# Patient Record
Sex: FEMALE | Race: WHITE | Employment: OTHER | URBAN - METROPOLITAN AREA
[De-identification: names, ages, dates, MRNs, and addresses within clinical notes are randomized per-mention and may not be internally consistent; named-entity substitution may affect disease eponyms.]

---

## 2018-06-17 ENCOUNTER — HOSPITAL ENCOUNTER (INPATIENT)
Age: 38
LOS: 4 days | Discharge: HOME | DRG: 638 | End: 2018-06-21
Attending: EMERGENCY MEDICINE | Admitting: FAMILY MEDICINE
Payer: MEDICARE

## 2018-06-17 ENCOUNTER — APPOINTMENT (OUTPATIENT)
Dept: GENERAL RADIOLOGY | Age: 38
DRG: 638 | End: 2018-06-17
Attending: EMERGENCY MEDICINE
Payer: MEDICARE

## 2018-06-17 DIAGNOSIS — A41.9 SEPTIC SHOCK (HCC): Primary | ICD-10-CM

## 2018-06-17 DIAGNOSIS — G47.33 OSA (OBSTRUCTIVE SLEEP APNEA): ICD-10-CM

## 2018-06-17 DIAGNOSIS — E87.29 RESPIRATORY ACIDOSIS: ICD-10-CM

## 2018-06-17 DIAGNOSIS — N17.9 ACUTE RENAL FAILURE, UNSPECIFIED ACUTE RENAL FAILURE TYPE (HCC): ICD-10-CM

## 2018-06-17 DIAGNOSIS — R65.21 SEPTIC SHOCK (HCC): Primary | ICD-10-CM

## 2018-06-17 DIAGNOSIS — E16.2 HYPOGLYCEMIA: ICD-10-CM

## 2018-06-17 DIAGNOSIS — J96.02 ACUTE RESPIRATORY FAILURE WITH HYPERCAPNIA (HCC): ICD-10-CM

## 2018-06-17 DIAGNOSIS — E66.01 MORBID OBESITY DUE TO EXCESS CALORIES (HCC): Chronic | ICD-10-CM

## 2018-06-17 DIAGNOSIS — E87.20 METABOLIC ACIDOSIS: ICD-10-CM

## 2018-06-17 DIAGNOSIS — E11.65 UNCONTROLLED TYPE 2 DIABETES MELLITUS WITH HYPERGLYCEMIA, WITHOUT LONG-TERM CURRENT USE OF INSULIN (HCC): Chronic | ICD-10-CM

## 2018-06-17 DIAGNOSIS — N17.9 AKI (ACUTE KIDNEY INJURY) (HCC): ICD-10-CM

## 2018-06-17 PROBLEM — R41.89 UNRESPONSIVE: Status: ACTIVE | Noted: 2018-06-17

## 2018-06-17 LAB
ALBUMIN SERPL-MCNC: 3.5 G/DL (ref 3.5–5)
ALBUMIN/GLOB SERPL: 0.9 {RATIO} (ref 1.2–3.5)
ALP SERPL-CCNC: 123 U/L (ref 50–136)
ALT SERPL-CCNC: 43 U/L (ref 12–65)
ANION GAP SERPL CALC-SCNC: 7 MMOL/L (ref 7–16)
ANION GAP SERPL CALC-SCNC: 8 MMOL/L (ref 7–16)
APPEARANCE UR: ABNORMAL
ARTERIAL PATENCY WRIST A: POSITIVE
ARTERIAL PATENCY WRIST A: POSITIVE
AST SERPL-CCNC: 25 U/L (ref 15–37)
BACTERIA URNS QL MICRO: 0 /HPF
BASE DEFICIT BLDA-SCNC: 3.8 MMOL/L (ref 0–2)
BASE DEFICIT BLDA-SCNC: 5.3 MMOL/L (ref 0–2)
BASOPHILS # BLD: 0 K/UL (ref 0–0.2)
BASOPHILS NFR BLD: 0 % (ref 0–2)
BDY SITE: ABNORMAL
BDY SITE: ABNORMAL
BILIRUB SERPL-MCNC: 0.5 MG/DL (ref 0.2–1.1)
BILIRUB UR QL: NEGATIVE
BUN SERPL-MCNC: 37 MG/DL (ref 6–23)
BUN SERPL-MCNC: 38 MG/DL (ref 6–23)
CALCIUM SERPL-MCNC: 8.1 MG/DL (ref 8.3–10.4)
CALCIUM SERPL-MCNC: 9.4 MG/DL (ref 8.3–10.4)
CHLORIDE SERPL-SCNC: 96 MMOL/L (ref 98–107)
CHLORIDE SERPL-SCNC: 99 MMOL/L (ref 98–107)
CK SERPL-CCNC: 278 U/L (ref 21–215)
CO2 SERPL-SCNC: 25 MMOL/L (ref 21–32)
CO2 SERPL-SCNC: 28 MMOL/L (ref 21–32)
COHGB MFR BLD: 0.9 % (ref 0.5–1.5)
COLOR UR: YELLOW
CREAT SERPL-MCNC: 3.26 MG/DL (ref 0.6–1)
CREAT SERPL-MCNC: 3.63 MG/DL (ref 0.6–1)
CRYSTALS URNS QL MICRO: ABNORMAL /LPF
DIFFERENTIAL METHOD BLD: ABNORMAL
DO-HGB BLD-MCNC: 11 % (ref 0–5)
EOSINOPHIL # BLD: 0.2 K/UL (ref 0–0.8)
EOSINOPHIL NFR BLD: 1 % (ref 0.5–7.8)
EPI CELLS #/AREA URNS HPF: ABNORMAL /HPF
ERYTHROCYTE [DISTWIDTH] IN BLOOD BY AUTOMATED COUNT: 15.8 % (ref 11.9–14.6)
GAS FLOW.O2 O2 DELIVERY SYS: 2 L/MIN
GAS FLOW.O2 O2 DELIVERY SYS: 4 L/MIN
GLOBULIN SER CALC-MCNC: 3.9 G/DL (ref 2.3–3.5)
GLUCOSE BLD STRIP.AUTO-MCNC: 114 MG/DL (ref 65–100)
GLUCOSE BLD STRIP.AUTO-MCNC: 51 MG/DL (ref 65–100)
GLUCOSE SERPL-MCNC: 54 MG/DL (ref 65–100)
GLUCOSE SERPL-MCNC: 70 MG/DL (ref 65–100)
GLUCOSE UR STRIP.AUTO-MCNC: NEGATIVE MG/DL
HCO3 BLDA-SCNC: 22 MMOL/L (ref 22–26)
HCO3 BLDA-SCNC: 22 MMOL/L (ref 22–26)
HCT VFR BLD AUTO: 37.2 % (ref 35.8–46.3)
HGB BLD-MCNC: 11.5 G/DL (ref 11.7–15.4)
HGB BLDMV-MCNC: 12.3 GM/DL (ref 11.7–15)
HGB UR QL STRIP: NEGATIVE
IMM GRANULOCYTES # BLD: 0.1 K/UL (ref 0–0.5)
IMM GRANULOCYTES NFR BLD AUTO: 1 % (ref 0–5)
KETONES UR QL STRIP.AUTO: NEGATIVE MG/DL
LACTATE BLD-SCNC: 2.6 MMOL/L (ref 0.5–1.9)
LACTATE SERPL-SCNC: 1.4 MMOL/L (ref 0.4–2)
LEUKOCYTE ESTERASE UR QL STRIP.AUTO: NEGATIVE
LIPASE SERPL-CCNC: 86 U/L (ref 73–393)
LYMPHOCYTES # BLD: 3 K/UL (ref 0.5–4.6)
LYMPHOCYTES NFR BLD: 19 % (ref 13–44)
MAGNESIUM SERPL-MCNC: 2.3 MG/DL (ref 1.8–2.4)
MCH RBC QN AUTO: 25.1 PG (ref 26.1–32.9)
MCHC RBC AUTO-ENTMCNC: 30.9 G/DL (ref 31.4–35)
MCV RBC AUTO: 81 FL (ref 79.6–97.8)
METHGB MFR BLD: 0.3 % (ref 0–1.5)
MONOCYTES # BLD: 0.8 K/UL (ref 0.1–1.3)
MONOCYTES NFR BLD: 5 % (ref 4–12)
NEUTS SEG # BLD: 11.9 K/UL (ref 1.7–8.2)
NEUTS SEG NFR BLD: 74 % (ref 43–78)
NITRITE UR QL STRIP.AUTO: NEGATIVE
OXYHGB MFR BLDA: 87.8 % (ref 94–97)
PCO2 BLDA: 45 MMHG (ref 35–45)
PCO2 BLDA: 50 MMHG (ref 35–45)
PH BLDA: 7.26 [PH] (ref 7.35–7.45)
PH BLDA: 7.32 [PH] (ref 7.35–7.45)
PH UR STRIP: 5 [PH] (ref 5–9)
PHOSPHATE SERPL-MCNC: 6.3 MG/DL (ref 2.5–4.5)
PLATELET # BLD AUTO: 288 K/UL (ref 150–450)
PMV BLD AUTO: 11 FL (ref 10.8–14.1)
PO2 BLDA: 65 MMHG (ref 80–105)
PO2 BLDA: 77 MMHG (ref 80–105)
POTASSIUM SERPL-SCNC: 3.8 MMOL/L (ref 3.5–5.1)
POTASSIUM SERPL-SCNC: 4.2 MMOL/L (ref 3.5–5.1)
PROT SERPL-MCNC: 7.4 G/DL (ref 6.3–8.2)
PROT UR STRIP-MCNC: 30 MG/DL
RBC # BLD AUTO: 4.59 M/UL (ref 4.05–5.25)
RBC #/AREA URNS HPF: ABNORMAL /HPF
SAO2 % BLD: 89 % (ref 92–98.5)
SERVICE CMNT-IMP: ABNORMAL
SERVICE CMNT-IMP: ABNORMAL
SODIUM SERPL-SCNC: 131 MMOL/L (ref 136–145)
SODIUM SERPL-SCNC: 132 MMOL/L (ref 136–145)
SP GR UR REFRACTOMETRY: 1.01 (ref 1–1.02)
TROPONIN I BLD-MCNC: 0 NG/ML (ref 0.02–0.05)
UROBILINOGEN UR QL STRIP.AUTO: 0.2 EU/DL (ref 0.2–1)
VENTILATION MODE VENT: ABNORMAL
VENTILATION MODE VENT: ABNORMAL
WBC # BLD AUTO: 15.9 K/UL (ref 4.3–11.1)
WBC URNS QL MICRO: ABNORMAL /HPF

## 2018-06-17 PROCEDURE — 36415 COLL VENOUS BLD VENIPUNCTURE: CPT | Performed by: FAMILY MEDICINE

## 2018-06-17 PROCEDURE — 96361 HYDRATE IV INFUSION ADD-ON: CPT | Performed by: EMERGENCY MEDICINE

## 2018-06-17 PROCEDURE — 65610000001 HC ROOM ICU GENERAL

## 2018-06-17 PROCEDURE — 74011000250 HC RX REV CODE- 250: Performed by: EMERGENCY MEDICINE

## 2018-06-17 PROCEDURE — 87076 CULTURE ANAEROBE IDENT EACH: CPT

## 2018-06-17 PROCEDURE — 87186 SC STD MICRODIL/AGAR DIL: CPT | Performed by: EMERGENCY MEDICINE

## 2018-06-17 PROCEDURE — 77010033711 HC HIGH FLOW OXYGEN

## 2018-06-17 PROCEDURE — 74011250636 HC RX REV CODE- 250/636

## 2018-06-17 PROCEDURE — 83690 ASSAY OF LIPASE: CPT | Performed by: EMERGENCY MEDICINE

## 2018-06-17 PROCEDURE — 84484 ASSAY OF TROPONIN QUANT: CPT

## 2018-06-17 PROCEDURE — 82550 ASSAY OF CK (CPK): CPT | Performed by: EMERGENCY MEDICINE

## 2018-06-17 PROCEDURE — 85025 COMPLETE CBC W/AUTO DIFF WBC: CPT | Performed by: EMERGENCY MEDICINE

## 2018-06-17 PROCEDURE — 93005 ELECTROCARDIOGRAM TRACING: CPT | Performed by: EMERGENCY MEDICINE

## 2018-06-17 PROCEDURE — 80048 BASIC METABOLIC PNL TOTAL CA: CPT | Performed by: FAMILY MEDICINE

## 2018-06-17 PROCEDURE — 87205 SMEAR GRAM STAIN: CPT | Performed by: EMERGENCY MEDICINE

## 2018-06-17 PROCEDURE — 84145 PROCALCITONIN (PCT): CPT | Performed by: FAMILY MEDICINE

## 2018-06-17 PROCEDURE — 83605 ASSAY OF LACTIC ACID: CPT | Performed by: FAMILY MEDICINE

## 2018-06-17 PROCEDURE — 87641 MR-STAPH DNA AMP PROBE: CPT | Performed by: EMERGENCY MEDICINE

## 2018-06-17 PROCEDURE — 74011000258 HC RX REV CODE- 258: Performed by: EMERGENCY MEDICINE

## 2018-06-17 PROCEDURE — 51702 INSERT TEMP BLADDER CATH: CPT | Performed by: EMERGENCY MEDICINE

## 2018-06-17 PROCEDURE — 74011250636 HC RX REV CODE- 250/636: Performed by: FAMILY MEDICINE

## 2018-06-17 PROCEDURE — 83605 ASSAY OF LACTIC ACID: CPT

## 2018-06-17 PROCEDURE — 96374 THER/PROPH/DIAG INJ IV PUSH: CPT | Performed by: EMERGENCY MEDICINE

## 2018-06-17 PROCEDURE — 83735 ASSAY OF MAGNESIUM: CPT | Performed by: EMERGENCY MEDICINE

## 2018-06-17 PROCEDURE — 36600 WITHDRAWAL OF ARTERIAL BLOOD: CPT

## 2018-06-17 PROCEDURE — 96375 TX/PRO/DX INJ NEW DRUG ADDON: CPT | Performed by: EMERGENCY MEDICINE

## 2018-06-17 PROCEDURE — 84100 ASSAY OF PHOSPHORUS: CPT | Performed by: EMERGENCY MEDICINE

## 2018-06-17 PROCEDURE — 74011250637 HC RX REV CODE- 250/637: Performed by: FAMILY MEDICINE

## 2018-06-17 PROCEDURE — 99285 EMERGENCY DEPT VISIT HI MDM: CPT | Performed by: EMERGENCY MEDICINE

## 2018-06-17 PROCEDURE — 82803 BLOOD GASES ANY COMBINATION: CPT

## 2018-06-17 PROCEDURE — 71045 X-RAY EXAM CHEST 1 VIEW: CPT

## 2018-06-17 PROCEDURE — 87040 BLOOD CULTURE FOR BACTERIA: CPT | Performed by: EMERGENCY MEDICINE

## 2018-06-17 PROCEDURE — 81001 URINALYSIS AUTO W/SCOPE: CPT | Performed by: EMERGENCY MEDICINE

## 2018-06-17 PROCEDURE — 77030021668 HC NEB PREFIL KT VYRM -A

## 2018-06-17 PROCEDURE — 87153 DNA/RNA SEQUENCING: CPT

## 2018-06-17 PROCEDURE — 77030005518 HC CATH URETH FOL 2W BARD -B

## 2018-06-17 PROCEDURE — 77030012794 HC KT NSL CANN/HGH TRAN -A

## 2018-06-17 PROCEDURE — 80053 COMPREHEN METABOLIC PANEL: CPT | Performed by: EMERGENCY MEDICINE

## 2018-06-17 PROCEDURE — 87077 CULTURE AEROBIC IDENTIFY: CPT | Performed by: EMERGENCY MEDICINE

## 2018-06-17 PROCEDURE — 82962 GLUCOSE BLOOD TEST: CPT

## 2018-06-17 PROCEDURE — 74011250636 HC RX REV CODE- 250/636: Performed by: EMERGENCY MEDICINE

## 2018-06-17 RX ORDER — FENOFIBRATE 67 MG/1
67 CAPSULE ORAL DAILY
COMMUNITY

## 2018-06-17 RX ORDER — SODIUM CHLORIDE 0.9 % (FLUSH) 0.9 %
5-10 SYRINGE (ML) INJECTION AS NEEDED
Status: DISCONTINUED | OUTPATIENT
Start: 2018-06-17 | End: 2018-06-21 | Stop reason: HOSPADM

## 2018-06-17 RX ORDER — METFORMIN HYDROCHLORIDE 500 MG/1
1000 TABLET, EXTENDED RELEASE ORAL 2 TIMES DAILY
COMMUNITY
End: 2019-03-21

## 2018-06-17 RX ORDER — GLIMEPIRIDE 2 MG/1
2 TABLET ORAL
Status: DISCONTINUED | OUTPATIENT
Start: 2018-06-18 | End: 2018-06-17

## 2018-06-17 RX ORDER — PREGABALIN 50 MG/1
100 CAPSULE ORAL 2 TIMES DAILY
Status: DISCONTINUED | OUTPATIENT
Start: 2018-06-18 | End: 2018-06-21 | Stop reason: HOSPADM

## 2018-06-17 RX ORDER — ATORVASTATIN CALCIUM 40 MG/1
40 TABLET, FILM COATED ORAL DAILY
COMMUNITY

## 2018-06-17 RX ORDER — SODIUM CHLORIDE, SODIUM LACTATE, POTASSIUM CHLORIDE, CALCIUM CHLORIDE 600; 310; 30; 20 MG/100ML; MG/100ML; MG/100ML; MG/100ML
1000 INJECTION, SOLUTION INTRAVENOUS
Status: COMPLETED | OUTPATIENT
Start: 2018-06-17 | End: 2018-06-17

## 2018-06-17 RX ORDER — DEXTROSE 50 % IN WATER (D50W) INTRAVENOUS SYRINGE
25-50 AS NEEDED
Status: DISCONTINUED | OUTPATIENT
Start: 2018-06-17 | End: 2018-06-21 | Stop reason: HOSPADM

## 2018-06-17 RX ORDER — ONDANSETRON 4 MG/1
4 TABLET, ORALLY DISINTEGRATING ORAL
COMMUNITY

## 2018-06-17 RX ORDER — LOSARTAN POTASSIUM AND HYDROCHLOROTHIAZIDE 12.5; 5 MG/1; MG/1
1 TABLET ORAL DAILY
COMMUNITY
End: 2019-03-21 | Stop reason: CLARIF

## 2018-06-17 RX ORDER — PREGABALIN 150 MG/1
150 CAPSULE ORAL 3 TIMES DAILY
COMMUNITY

## 2018-06-17 RX ORDER — NALOXONE HYDROCHLORIDE 1 MG/ML
INJECTION INTRAMUSCULAR; INTRAVENOUS; SUBCUTANEOUS
Status: COMPLETED
Start: 2018-06-17 | End: 2018-06-17

## 2018-06-17 RX ORDER — DEXTROSE 40 %
15 GEL (GRAM) ORAL AS NEEDED
Status: DISCONTINUED | OUTPATIENT
Start: 2018-06-17 | End: 2018-06-21 | Stop reason: HOSPADM

## 2018-06-17 RX ORDER — NALOXONE HYDROCHLORIDE 1 MG/ML
2 INJECTION INTRAMUSCULAR; INTRAVENOUS; SUBCUTANEOUS
Status: COMPLETED | OUTPATIENT
Start: 2018-06-17 | End: 2018-06-17

## 2018-06-17 RX ORDER — DULOXETIN HYDROCHLORIDE 60 MG/1
60 CAPSULE, DELAYED RELEASE ORAL DAILY
COMMUNITY
End: 2018-11-19

## 2018-06-17 RX ORDER — FLUTICASONE PROPIONATE 50 MCG
2 SPRAY, SUSPENSION (ML) NASAL DAILY
COMMUNITY
End: 2018-11-19

## 2018-06-17 RX ORDER — HEPARIN SODIUM 5000 [USP'U]/ML
5000 INJECTION, SOLUTION INTRAVENOUS; SUBCUTANEOUS EVERY 8 HOURS
Status: DISCONTINUED | OUTPATIENT
Start: 2018-06-17 | End: 2018-06-21 | Stop reason: HOSPADM

## 2018-06-17 RX ORDER — FAMOTIDINE 20 MG/1
20 TABLET, FILM COATED ORAL EVERY 12 HOURS
Status: DISCONTINUED | OUTPATIENT
Start: 2018-06-17 | End: 2018-06-21 | Stop reason: HOSPADM

## 2018-06-17 RX ORDER — SODIUM CHLORIDE 9 MG/ML
125 INJECTION, SOLUTION INTRAVENOUS CONTINUOUS
Status: DISCONTINUED | OUTPATIENT
Start: 2018-06-17 | End: 2018-06-19

## 2018-06-17 RX ORDER — INSULIN LISPRO 100 [IU]/ML
INJECTION, SOLUTION INTRAVENOUS; SUBCUTANEOUS
COMMUNITY
End: 2019-10-04

## 2018-06-17 RX ORDER — TIZANIDINE 4 MG/1
4 TABLET ORAL
COMMUNITY

## 2018-06-17 RX ORDER — ERYTHROMYCIN AND BENZOYL PEROXIDE 30; 50 MG/G; MG/G
GEL TOPICAL 2 TIMES DAILY
COMMUNITY

## 2018-06-17 RX ORDER — LEVOTHYROXINE SODIUM 100 UG/1
100 TABLET ORAL
Status: DISCONTINUED | OUTPATIENT
Start: 2018-06-18 | End: 2018-06-21 | Stop reason: HOSPADM

## 2018-06-17 RX ORDER — DEXTROSE 50 % IN WATER (D50W) INTRAVENOUS SYRINGE
50
Status: COMPLETED | OUTPATIENT
Start: 2018-06-17 | End: 2018-06-17

## 2018-06-17 RX ORDER — HYDROCODONE BITARTRATE AND ACETAMINOPHEN 5; 325 MG/1; MG/1
1 TABLET ORAL EVERY 12 HOURS
Status: DISCONTINUED | OUTPATIENT
Start: 2018-06-17 | End: 2018-06-18 | Stop reason: ALTCHOICE

## 2018-06-17 RX ORDER — ACETAMINOPHEN 325 MG/1
650 TABLET ORAL
Status: DISCONTINUED | OUTPATIENT
Start: 2018-06-17 | End: 2018-06-21 | Stop reason: HOSPADM

## 2018-06-17 RX ORDER — DULOXETIN HYDROCHLORIDE 30 MG/1
30 CAPSULE, DELAYED RELEASE ORAL DAILY
Status: DISCONTINUED | OUTPATIENT
Start: 2018-06-18 | End: 2018-06-21 | Stop reason: HOSPADM

## 2018-06-17 RX ORDER — SODIUM CHLORIDE 0.9 % (FLUSH) 0.9 %
5 SYRINGE (ML) INJECTION EVERY 8 HOURS
Status: DISCONTINUED | OUTPATIENT
Start: 2018-06-17 | End: 2018-06-21 | Stop reason: HOSPADM

## 2018-06-17 RX ORDER — MORPHINE SULFATE 15 MG/1
15 TABLET ORAL 2 TIMES DAILY
COMMUNITY
End: 2019-03-21

## 2018-06-17 RX ADMIN — VANCOMYCIN HYDROCHLORIDE 1000 MG: 1 INJECTION, POWDER, LYOPHILIZED, FOR SOLUTION INTRAVENOUS at 23:45

## 2018-06-17 RX ADMIN — FAMOTIDINE 20 MG: 20 TABLET ORAL at 23:15

## 2018-06-17 RX ADMIN — Medication 5 ML: at 23:44

## 2018-06-17 RX ADMIN — HEPARIN SODIUM 5000 UNITS: 5000 INJECTION, SOLUTION INTRAVENOUS; SUBCUTANEOUS at 23:15

## 2018-06-17 RX ADMIN — DEXTROSE MONOHYDRATE 25 G: 25 INJECTION, SOLUTION INTRAVENOUS at 20:17

## 2018-06-17 RX ADMIN — SODIUM CHLORIDE 1000 ML: 900 INJECTION, SOLUTION INTRAVENOUS at 20:30

## 2018-06-17 RX ADMIN — SODIUM CHLORIDE, SODIUM LACTATE, POTASSIUM CHLORIDE, AND CALCIUM CHLORIDE 1000 ML/HR: 600; 310; 30; 20 INJECTION, SOLUTION INTRAVENOUS at 22:06

## 2018-06-17 RX ADMIN — VANCOMYCIN HYDROCHLORIDE 1000 MG: 1 INJECTION, POWDER, LYOPHILIZED, FOR SOLUTION INTRAVENOUS at 23:00

## 2018-06-17 RX ADMIN — SODIUM CHLORIDE 1000 ML: 900 INJECTION, SOLUTION INTRAVENOUS at 20:23

## 2018-06-17 RX ADMIN — NALOXONE HYDROCHLORIDE 2 MG: 1 INJECTION INTRAMUSCULAR; INTRAVENOUS; SUBCUTANEOUS at 20:37

## 2018-06-17 RX ADMIN — NALOXONE HYDROCHLORIDE 2 MG: 1 INJECTION PARENTERAL at 20:37

## 2018-06-17 RX ADMIN — PIPERACILLIN SODIUM,TAZOBACTAM SODIUM 4.5 G: 4; .5 INJECTION, POWDER, FOR SOLUTION INTRAVENOUS at 21:42

## 2018-06-17 RX ADMIN — SODIUM CHLORIDE 1000 ML: 900 INJECTION, SOLUTION INTRAVENOUS at 22:01

## 2018-06-17 RX ADMIN — HYDROCODONE BITARTRATE AND ACETAMINOPHEN 1 TABLET: 5; 325 TABLET ORAL at 23:43

## 2018-06-17 RX ADMIN — SODIUM CHLORIDE 125 ML/HR: 900 INJECTION, SOLUTION INTRAVENOUS at 23:11

## 2018-06-17 NOTE — IP AVS SNAPSHOT
Tirso Mesfin 
 
 
 [address] 
[phone] Patient: Eliel Bains MRN: [id number] RYY:1/8/1871 About your hospitalization You were admitted on:  June 17, 2018 You last received care in the:  [address] [place] [address] You were discharged on:  June 21, 2018 Why you were hospitalized Your primary diagnosis was:  Unresponsive Your diagnoses also included:  Arf (Acute Renal Failure) (Hcc), Hypoglycemia, Sepsis (Hcc), Uncontrolled Type 2 Diabetes Mellitus, Without Long-Term Current Use Of Insulin (Hcc), Respiratory Acidosis, Morbid Obesity Due To Excess Calories (Hcc), Harlan (Obstructive Sleep Apnea) Follow-up Information Follow up With Details Comments Contact Info [facility] [place] [facility] On 6/24/2018 8:30 PM [address] [place] [phone] 
[phone] Pepe Dodson MD On 7/3/2018 APPMNT WITH DR. BLACK - 7/3/18 @ 10:10AM. [address]  [address] [facility] [place] Shayla Felix [phone] 
[phone] MD Jocelynn Tillman DO On 6/27/2018 APPMNT WITH [address] ON 6/27/18 @ 11:00AM [address] [facility] [place] [address] [address] 
[phone] Your Scheduled Appointments Tuesday July 03, 2018 10:40 AM EDT  
(Arrive by 12:10 AM) HOSPITAL with Olivia Carter NP [facility] [address]  [address] [place] [address] [facility]  
[phone] Discharge Orders None A check rosalba indicates which time of day the medication should be taken. My Medications CHANGE how you take these medications Instructions Each Dose to Equal  
 Morning Noon Evening Bedtime  
 insulin glargine 300 unit/mL (1.5 mL) Inpn What changed:  how much to take 40 Units by SubCUTAneous route nightly for 5 days. 40 Units * pregabalin 150 mg capsule Commonly known as:  Vi Hsieh What changed:  Another medication with the same name was changed. Make sure you understand how and when to take each. Your next dose is:  TODAY Take 150 mg by mouth three (3) times daily. 150 mg  
    
   
   
  
   
  
  
 * pregabalin 100 mg capsule Commonly known as:  Albert Okeefe What changed:  when to take this Take 1 Cap by mouth two (2) times a day. Max Daily Amount: 200 mg.  
 100 mg * Notice: This list has 2 medication(s) that are the same as other medications prescribed for you. Read the directions carefully, and ask your doctor or other care provider to review them with you. CONTINUE taking these medications Instructions Each Dose to Equal  
 Morning Noon Evening Bedtime  
 atorvastatin 40 mg tablet Commonly known as:  LIPITOR Your next dose is:  TODAY Take 40 mg by mouth daily. 40 mg  
    
   
   
   
  
  
 benzoyl peroxide-erythromycin 3-5 % topical gel Commonly known as:  Warnell Zendejas Your next dose is:  TODAY Apply  to affected area two (2) times a day. diclofenac 1 % Gel Commonly known as:  VOLTAREN Apply  to affected area four (4) times daily as needed for Pain. DULoxetine 60 mg capsule Commonly known as:  CYMBALTA Your next dose is:  TOMORROW Take 60 mg by mouth daily. 60 mg  
    
   
   
   
  
 exenatide microspheres 2 mg/0.85 mL Atin  
   
 2 mg by SubCUTAneous route every seven (7) days. 2 mg  
    
   
   
   
  
 fenofibrate micronized 67 mg capsule Commonly known as:  LOFIBRA Your next dose is:  TOMORROW Take 67 mg by mouth daily. 67 mg  
    
   
   
   
  
 fluticasone 50 mcg/actuation nasal spray Commonly known as:  Susu Fetch 2 Sprays by Both Nostrils route daily. 2 Spray  
    
   
   
   
  
 insulin lispro 100 unit/mL kwikpen Commonly known as:  HUMALOG Your next dose is:  TODAY 35 Units by SubCUTAneous route Before breakfast, lunch, and dinner. 35 Units  
    
   
   
  
   
  
 levothyroxine 75 mcg tablet Commonly known as:  SYNTHROID Your next dose is:  TOMORROW Take 100 mcg by mouth Daily (before breakfast). 100 mcg  
    
   
   
   
  
 lidocaine 5 % topical cream  
   
 Apply  to affected area two (2) times daily as needed for Itching. Liraglutide 0.6 mg/0.1 mL (18 mg/3 mL) Pnij Commonly known as:  Nedra Oh Your next dose is:  TOMORROW  
   
 0.6 mg by SubCUTAneous route daily. 0.6 mg  
    
   
   
   
  
 losartan-hydroCHLOROthiazide 50-12.5 mg per tablet Commonly known as:  HYZAAR Your next dose is:  TOMORROW Take 1 Tab by mouth daily. 1 Tab  
    
   
   
   
  
 metFORMIN  mg tablet Commonly known as:  GLUCOPHAGE XR Your next dose is:  TODAY Take 1,000 mg by mouth two (2) times a day. 1000 mg  
    
   
   
   
  
  
 morphine IR 15 mg tablet Commonly known as:  MS IR Take 15 mg by mouth two (2) times a day. 15 mg  
    
   
   
   
  
 tiZANidine 4 mg tablet Commonly known as:  Elmer Mins Take 4 mg by mouth three (3) times daily as needed. 4 mg ZOFRAN ODT 4 mg disintegrating tablet Generic drug:  ondansetron Take 4 mg by mouth every eight (8) hours as needed for Nausea. 4 mg Where to Get Your Medications These medications were sent to [address] [place] [address] - [place] - [address] [facility] AT [facility]  [address] [facility]  [address] [place] Phone:  [phone]  
  insulin glargine 300 unit/mL (1.5 mL) Inpn Information on where to get these meds will be given to you by the nurse or doctor. ! Ask your nurse or doctor about these medications  
  pregabalin 100 mg capsule Opioid Education Prescription Opioids: What You Need to Know: 
 
 
Dental Appliances: None Visual Aid: Glasses, With patient Home Medications: None Jewelry: None Clothing: None Other Valuables: None PATIENT INSTRUCTIONS: 
 
After general anesthesia or intravenous sedation, for 24 hours or while taking prescription Narcotics: · Limit your activities · Do not drive and operate hazardous machinery · Do not make important personal or business decisions · Do  not drink alcoholic beverages · If you have not urinated within 8 hours after discharge, please contact your surgeon on call. Report the following to your surgeon: 
· Excessive pain, swelling, redness or odor of or around the surgical area · Temperature over 100.5 · Nausea and vomiting lasting longer than 4 hours or if unable to take medications · Any signs of decreased circulation or nerve impairment to extremity: change in color, persistent  numbness, tingling, coldness or increase pain · Any questions What to do at Home: 
Recommended activity: Activity as tolerated, per MD 
 
If you experience any of the following symptoms fever>101, pain unrelieved with medication, nausea/vomiting, shortness of breath, dizziness/fainting, chest pain. , please follow up with your doctor. *  Please give a list of your current medications to your Primary Care Provider. *  Please update this list whenever your medications are discontinued, doses are 
    changed, or new medications (including over-the-counter products) are added. *  Please carry medication information at all times in case of emergency situations. These are general instructions for a healthy lifestyle: No smoking/ No tobacco products/ Avoid exposure to second hand smoke Surgeon General's Warning:  Quitting smoking now greatly reduces serious risk to your health. Obesity, smoking, and sedentary lifestyle greatly increases your risk for illness A healthy diet, regular physical exercise & weight monitoring are important for maintaining a healthy lifestyle You may be retaining fluid if you have a history of heart failure or if you experience any of the following symptoms:  Weight gain of 3 pounds or more overnight or 5 pounds in a week, increased swelling in our hands or feet or shortness of breath while lying flat in bed. Please call your doctor as soon as you notice any of these symptoms; do not wait until your next office visit. Recognize signs and symptoms of STROKE: 
 
F-face looks uneven A-arms unable to move or move unevenly S-speech slurred or non-existent T-time-call 911 as soon as signs and symptoms begin-DO NOT go Back to bed or wait to see if you get better-TIME IS BRAIN. Warning Signs of HEART ATTACK Call 911 if you have these symptoms: 
? Chest discomfort. Most heart attacks involve discomfort in the center of the chest that lasts more than a few minutes, or that goes away and comes back. It can feel like uncomfortable pressure, squeezing, fullness, or pain. ? Discomfort in other areas of the upper body. Symptoms can include pain or discomfort in one or both arms, the back, neck, jaw, or stomach. ? Shortness of breath with or without chest discomfort. ? Other signs may include breaking out in a cold sweat, nausea, or lightheadedness. Don't wait more than five minutes to call [address]! Fast action can save your life. Calling 911 is almost always the fastest way to get lifesaving treatment.  Emergency Medical Services staff can begin treatment when they arrive  up to an hour sooner than if someone gets to the hospital by car. The discharge information has been reviewed with the patient. The patient verbalized understanding. Discharge medications reviewed with the patient and appropriate educational materials and side effects teaching were provided. Learning About Bariatric Surgery What is bariatric surgery? Bariatric surgery is surgery to help you lose weight. This type of surgery is only used for people who are very overweight and have not been able to lose weight with diet and exercise. This surgery makes the stomach smaller. Some types of surgery also change the connection between your stomach and intestines. How is bariatric surgery done? Bariatric surgery may be either \"open\" or \"laparoscopic. \" Open surgery is done through a large cut (incision) in the belly. Laparoscopic surgery is done through several small cuts. The doctor puts a lighted tube, or scope, and other surgical tools through small cuts in your belly. The doctor is able to see your organs with the scope. There are different types of bariatric surgery. Gastric sleeve surgery The surgery is usually done through several small incisions in the belly. The doctor removes more than half of your stomach. This leaves a thin sleeve, or tube, that is about the size of a banana. Because part of your stomach has been removed, this can't be reversed. Shea-en-Y gastric bypass surgery Shea-en-Y (say \"perla-en-why\") surgery changes the connection between the stomach and the intestines. The doctor separates a section of your stomach from the rest of your stomach. This makes a small pouch. The new pouch will hold the food you eat. The doctor connects the stomach pouch to the middle part of the small intestine. Gastric banding surgery The surgery is usually done through several small incisions in the belly. The doctor wraps a band around the upper part of the stomach. This creates a small pouch. The small size of the pouch means that you will get full after you eat just a small amount of food. The doctor can inflate or deflate the band to adjust the size. This lets the doctor adjust how quickly food passes from the new pouch into the stomach. It does not change the connection between the stomach and the intestines. What can you expect after the surgery? You may stay in the hospital for one or more days after the surgery. How long you stay depends on the type of surgery you had. Most people need 2 to 4 weeks before they are ready to get back to their usual routine. For the first 2 to 6 weeks after surgery, you probably will need to follow a liquid or soft diet. Bit by bit, you will be able to eat more solid foods. Your doctor may advise you to work with a dietitian. This way you'll be sure to get enough protein, vitamins, and minerals while you are losing weight. Even with a healthy diet, you may need to take vitamin and mineral supplements. After surgery, you will not be able to eat very much at one time. You will get full quickly. Try not to eat too much at one time or eat foods that are high in fat or sugar. If you do, you may vomit, get stomach pain, or have diarrhea. You probably will lose weight very quickly in the first few months after surgery. As time goes on, your weight loss will slow down. You will have regular doctor visits to check how you are doing. Think of bariatric surgery as a tool to help you lose weight. It isn't an instant fix. You will still need to eat a healthy diet and get regular exercise. This will help you reach your weight goal and avoid regaining the weight you lose. Follow-up care is a key part of your treatment and safety. Be sure to make and go to all appointments, and call your doctor if you are having problems.  It's also a good idea to know your test results and keep a list of the medicines you take. Where can you learn more? Go to http://mayte-nina.info/. Enter G469 in the search box to learn more about \"Learning About Bariatric Surgery. \" Current as of: October 13, 2016 Content Version: 11.4 © 1939-9787 [email or web address]. Care instructions adapted under license by [employer] (which disclaims liability or warranty for this information). If you have questions about a medical condition or this instruction, always ask your healthcare professional. [place] [address] any warranty or liability for your use of this information. OneFold Announcement We are excited to announce that we are making your provider's discharge notes available to you in OneFold. You will see these notes when they are completed and signed by the physician that discharged you from your recent hospital stay. If you have any questions or concerns about any information you see in OneFold, please call the Health Information Department where you were seen or reach out to your Primary Care Provider for more information about your plan of care. Introducing [facility]! [employer] introduces OneFold patient portal. Now you can access parts of your medical record, email your doctor's office, and request medication refills online. 1. In your internet browser, go to [email or web address] 2. Click on the First Time User? Click Here link in the Sign In box. You will see the New Member Sign Up page. 3. Enter your OneFold Access Code exactly as it appears below. You will not need to use this code after youve completed the sign-up process. If you do not sign up before the expiration date, you must request a new code. · OneFold Access Code: [email or web address] Expires: 9/15/2018  7:51 PM 
 
4.  Enter the last four digits of your Social Security Number (xxxx) and Date of Birth (mm/dd/yyyy) as indicated and click Submit. You will be taken to the next sign-up page. 5. Create a [employer] Salest ID. This will be your [employer] login ID and cannot be changed, so think of one that is secure and easy to remember. 6. Create a [employer] Salest password. You can change your password at any time. 7. Enter your Password Reset Question and Answer. This can be used at a later time if you forget your password. 8. Enter your e-mail address. You will receive e-mail notification when new information is available in [address] 9. Click Sign Up. You can now view and download portions of your medical record. 10. Click the Download Summary menu link to download a portable copy of your medical information. If you have questions, please visit the Frequently Asked Questions section of the [employer] website. Remember, [employer] is NOT to be used for urgent needs. For medical emergencies, dial 911. Now available from your iPhone and Android! Introducing Justyn Marie As a [facility] patient, I wanted to make you aware of our electronic visit tool called Justyn Johnscharlie. [facility] 24/7 allows you to connect within minutes with a medical provider 24 hours a day, seven days a week via a mobile device or tablet or logging into a secure website from your computer. You can access Justyn Marie from anywhere in the [place]. A virtual visit might be right for you when you have a simple condition and feel like you just dont want to get out of bed, or cant get away from work for an appointment, when your regular [facility] provider is not available (evenings, weekends or holidays), or when youre out of town and need minor care. Electronic visits cost only $49 and if the [facility] 24/7 provider determines a prescription is needed to treat your condition, one can be electronically transmitted to a nearby pharmacy*. Please take a moment to enroll today if you have not already done so. The enrollment process is free and takes just a few minutes. To enroll, please download the [employer] 24/7 alphonse to your tablet or phone, or visit [email or web address] to enroll on your computer. And, as an [address] [place] [address] patient with a [employer] account, the results of your visits will be scanned into your electronic medical record and your primary care provider will be able to view the scanned results. We urge you to continue to see your regular [facility] provider for your ongoing medical care. And while your primary care provider may not be the one available when you seek a Justyn Marie virtual visit, the peace of mind you get from getting a real diagnosis real time can be priceless. For more information on Justyn Marie, view our Frequently Asked Questions (FAQs) at [email or web address]. Sincerely, 
 
Levi Rodrigez MD 
Chief Medical Officer [address] [place] [address] *:  certain medications cannot be prescribed via Justyn Marie Unresulted tests-please follow up with your PCP on these results Procedure/Test Authorizing Provider  BLOOD GAS, ARTERIAL Carley Rodarte MD  
 BLOOD GAS, ARTERIAL Carley Rodarte MD  
 BLOOD GAS, ARTERIAL Stacey Dubois MD  
 BLOOD GAS, ARTERIAL Stacey Dubois MD  
 BLOOD GAS, Renetta Ceja MD  
 BLOOD GAS, Ynes Elizalde MD  
 CBC WITH AUTOMATED MICHELLE Rodarte MD  
 CBC WITH AUTOMATED DIFF Stacey Dubois MD  
 Jean-Pierre Cross MD  
 CORTISOL, AM Isaak Tate MD  
 CULTURE, BLOOD Oswaldo Becker MD  
 CULTURE, BLOOD Oswaldo Becker MD  
 EKG, 12 LEAD, INITIAL Oswaldo Becker MD  
 HEMOGLOBIN A1C WITH EAG Sheridan Marvin MD  
 LACTIC ACID Sheridan Marvin MD  
 LACTIC ACID MD Evelyn Soares MD  
 MAGNESIUM MD Bhavani Soares MD  
 METABOLIC PANEL, BASIC Sheridan Marvin MD  
 METABOLIC PANEL, COMPREHENSIVE Sheridan Marvin MD  
 METABOLIC PANEL, COMPREHENSIVE Oswaldo Becker MD  
 PHOSPHORUS Sheridan Marvin MD  
 PHOSPHORUS Oswaldo Becker MD  
 PROCALCITONIN Sheridan Marvin MD  
 TSH 3RD GENERATION Isaak Tate MD  
 URINALYSIS W/ RFLX MICROSCOPIC MD Poncho Aiken MD  
 VANCOMYCIN, Judge Brown MD  
 XR CHEST PORT Oswaldo Becker MD  
  
Providers Seen During Your Hospitalization Provider Specialty Primary office phone Oswaldo Becker MD Emergency Medicine [phone] Sheridan Marvin MD Family Practice [phone] Your Primary Care Physician (PCP) Primary Care Physician Office Phone Office Fax [place] ** None ** ** None ** You are allergic to the following Allergen Reactions Codeine Other (comments) Lupie; not a problem Recent Documentation Height Weight BMI OB Status Smoking Status 1.651 m (!) 194.5 kg 71.34 kg/m2 Having regular periods Never Smoker Emergency Contacts Name Discharge Info Relation Home Work Mobile Aminaashley Beeerly  Parent [1] [phone] Poor,Ross DISCHARGE CAREGIVER [3] Spouse [3] [phone] Patient Belongings The following personal items are in your possession at time of discharge: Dental Appliances: None  Visual Aid: Glasses, With patient      Home Medications: None   Jewelry: None  Clothing: None    Other Valuables: None Please provide this summary of care documentation to your next provider. Signatures-by signing, you are acknowledging that this After Visit Summary has been reviewed with you and you have received a copy. Patient Signature:  ____________________________________________________________ Date:  ____________________________________________________________  
  
Devonte Mais Provider Signature:  ____________________________________________________________ Date:  ____________________________________________________________

## 2018-06-18 PROBLEM — G47.33 OSA (OBSTRUCTIVE SLEEP APNEA): Status: ACTIVE | Noted: 2018-06-18

## 2018-06-18 LAB
ALBUMIN SERPL-MCNC: 3.1 G/DL (ref 3.5–5)
ALBUMIN/GLOB SERPL: 0.8 {RATIO} (ref 1.2–3.5)
ALP SERPL-CCNC: 108 U/L (ref 50–136)
ALT SERPL-CCNC: 40 U/L (ref 12–65)
ANION GAP SERPL CALC-SCNC: 10 MMOL/L (ref 7–16)
ARTERIAL PATENCY WRIST A: POSITIVE
ARTERIAL PATENCY WRIST A: POSITIVE
AST SERPL-CCNC: 37 U/L (ref 15–37)
ATRIAL RATE: 118 BPM
BASE DEFICIT BLDA-SCNC: 2.6 MMOL/L (ref 0–2)
BASE DEFICIT BLDA-SCNC: 3.9 MMOL/L (ref 0–2)
BASOPHILS # BLD: 0 K/UL (ref 0–0.2)
BASOPHILS NFR BLD: 0 % (ref 0–2)
BDY SITE: ABNORMAL
BDY SITE: ABNORMAL
BILIRUB SERPL-MCNC: 0.4 MG/DL (ref 0.2–1.1)
BUN SERPL-MCNC: 33 MG/DL (ref 6–23)
CALCIUM SERPL-MCNC: 8.3 MG/DL (ref 8.3–10.4)
CALCULATED P AXIS, ECG09: 55 DEGREES
CALCULATED R AXIS, ECG10: 98 DEGREES
CALCULATED T AXIS, ECG11: 23 DEGREES
CHLORIDE SERPL-SCNC: 98 MMOL/L (ref 98–107)
CO2 SERPL-SCNC: 26 MMOL/L (ref 21–32)
CREAT SERPL-MCNC: 2.46 MG/DL (ref 0.6–1)
DIAGNOSIS, 93000: NORMAL
DIFFERENTIAL METHOD BLD: ABNORMAL
EOSINOPHIL # BLD: 0.1 K/UL (ref 0–0.8)
EOSINOPHIL NFR BLD: 0 % (ref 0.5–7.8)
ERYTHROCYTE [DISTWIDTH] IN BLOOD BY AUTOMATED COUNT: 16 % (ref 11.9–14.6)
EST. AVERAGE GLUCOSE BLD GHB EST-MCNC: 194 MG/DL
GAS FLOW.O2 O2 DELIVERY SYS: 4 L/MIN
GLOBULIN SER CALC-MCNC: 3.8 G/DL (ref 2.3–3.5)
GLUCOSE BLD STRIP.AUTO-MCNC: 132 MG/DL (ref 65–100)
GLUCOSE BLD STRIP.AUTO-MCNC: 199 MG/DL (ref 65–100)
GLUCOSE BLD STRIP.AUTO-MCNC: 235 MG/DL (ref 65–100)
GLUCOSE BLD STRIP.AUTO-MCNC: 249 MG/DL (ref 65–100)
GLUCOSE BLD STRIP.AUTO-MCNC: 252 MG/DL (ref 65–100)
GLUCOSE SERPL-MCNC: 177 MG/DL (ref 65–100)
HBA1C MFR BLD: 8.4 % (ref 4.8–6)
HCO3 BLDA-SCNC: 24 MMOL/L (ref 22–26)
HCO3 BLDA-SCNC: 24 MMOL/L (ref 22–26)
HCT VFR BLD AUTO: 34 % (ref 35.8–46.3)
HGB BLD-MCNC: 11.1 G/DL (ref 11.7–15.4)
IMM GRANULOCYTES # BLD: 0.1 K/UL (ref 0–0.5)
IMM GRANULOCYTES NFR BLD AUTO: 0 % (ref 0–5)
LACTATE SERPL-SCNC: 0.7 MMOL/L (ref 0.4–2)
LYMPHOCYTES # BLD: 1.8 K/UL (ref 0.5–4.6)
LYMPHOCYTES NFR BLD: 12 % (ref 13–44)
MAGNESIUM SERPL-MCNC: 2.3 MG/DL (ref 1.8–2.4)
MCH RBC QN AUTO: 26.2 PG (ref 26.1–32.9)
MCHC RBC AUTO-ENTMCNC: 32.6 G/DL (ref 31.4–35)
MCV RBC AUTO: 80.2 FL (ref 79.6–97.8)
MONOCYTES # BLD: 1.6 K/UL (ref 0.1–1.3)
MONOCYTES NFR BLD: 10 % (ref 4–12)
NEUTS SEG # BLD: 11.6 K/UL (ref 1.7–8.2)
NEUTS SEG NFR BLD: 78 % (ref 43–78)
P-R INTERVAL, ECG05: 136 MS
PCO2 BLDA: 50 MMHG (ref 35–45)
PCO2 BLDA: 53 MMHG (ref 35–45)
PH BLDA: 7.27 [PH] (ref 7.35–7.45)
PH BLDA: 7.3 [PH] (ref 7.35–7.45)
PHOSPHATE SERPL-MCNC: 5.3 MG/DL (ref 2.5–4.5)
PLATELET # BLD AUTO: 261 K/UL (ref 150–450)
PMV BLD AUTO: 11 FL (ref 10.8–14.1)
PO2 BLDA: 119 MMHG (ref 80–105)
PO2 BLDA: 82 MMHG (ref 80–105)
POTASSIUM SERPL-SCNC: 4.8 MMOL/L (ref 3.5–5.1)
PROCALCITONIN SERPL-MCNC: 0.3 NG/ML
PROT SERPL-MCNC: 6.9 G/DL (ref 6.3–8.2)
Q-T INTERVAL, ECG07: 310 MS
QRS DURATION, ECG06: 82 MS
QTC CALCULATION (BEZET), ECG08: 434 MS
RBC # BLD AUTO: 4.24 M/UL (ref 4.05–5.25)
RESP RATE: 16
SERVICE CMNT-IMP: ABNORMAL
SERVICE CMNT-IMP: ABNORMAL
SODIUM SERPL-SCNC: 134 MMOL/L (ref 136–145)
VENTILATION MODE VENT: ABNORMAL
VENTILATION MODE VENT: ABNORMAL
VENTRICULAR RATE, ECG03: 118 BPM
WBC # BLD AUTO: 15.2 K/UL (ref 4.3–11.1)

## 2018-06-18 PROCEDURE — 74011636637 HC RX REV CODE- 636/637: Performed by: FAMILY MEDICINE

## 2018-06-18 PROCEDURE — 77030018719 HC DRSG PTCH ANTIMIC J&J -A

## 2018-06-18 PROCEDURE — 84100 ASSAY OF PHOSPHORUS: CPT | Performed by: FAMILY MEDICINE

## 2018-06-18 PROCEDURE — 99223 1ST HOSP IP/OBS HIGH 75: CPT | Performed by: INTERNAL MEDICINE

## 2018-06-18 PROCEDURE — 83036 HEMOGLOBIN GLYCOSYLATED A1C: CPT | Performed by: FAMILY MEDICINE

## 2018-06-18 PROCEDURE — 83605 ASSAY OF LACTIC ACID: CPT | Performed by: FAMILY MEDICINE

## 2018-06-18 PROCEDURE — 77030020263 HC SOL INJ SOD CL0.9% LFCR 1000ML

## 2018-06-18 PROCEDURE — 80053 COMPREHEN METABOLIC PANEL: CPT | Performed by: FAMILY MEDICINE

## 2018-06-18 PROCEDURE — 65610000001 HC ROOM ICU GENERAL

## 2018-06-18 PROCEDURE — 77030018786 HC NDL GD F/USND BARD -B

## 2018-06-18 PROCEDURE — 76937 US GUIDE VASCULAR ACCESS: CPT

## 2018-06-18 PROCEDURE — 74011250637 HC RX REV CODE- 250/637: Performed by: FAMILY MEDICINE

## 2018-06-18 PROCEDURE — 36415 COLL VENOUS BLD VENIPUNCTURE: CPT | Performed by: FAMILY MEDICINE

## 2018-06-18 PROCEDURE — C1751 CATH, INF, PER/CENT/MIDLINE: HCPCS

## 2018-06-18 PROCEDURE — 74011250636 HC RX REV CODE- 250/636: Performed by: FAMILY MEDICINE

## 2018-06-18 PROCEDURE — 4A02X4A MEASUREMENT OF CARDIAC ELECTRICAL ACTIVITY, GUIDANCE, EXTERNAL APPROACH: ICD-10-PCS | Performed by: FAMILY MEDICINE

## 2018-06-18 PROCEDURE — 94660 CPAP INITIATION&MGMT: CPT

## 2018-06-18 PROCEDURE — 85025 COMPLETE CBC W/AUTO DIFF WBC: CPT | Performed by: FAMILY MEDICINE

## 2018-06-18 PROCEDURE — 74011000258 HC RX REV CODE- 258: Performed by: FAMILY MEDICINE

## 2018-06-18 PROCEDURE — 83735 ASSAY OF MAGNESIUM: CPT | Performed by: FAMILY MEDICINE

## 2018-06-18 PROCEDURE — 36569 INSJ PICC 5 YR+ W/O IMAGING: CPT | Performed by: FAMILY MEDICINE

## 2018-06-18 PROCEDURE — 82962 GLUCOSE BLOOD TEST: CPT

## 2018-06-18 PROCEDURE — 02HV33Z INSERTION OF INFUSION DEVICE INTO SUPERIOR VENA CAVA, PERCUTANEOUS APPROACH: ICD-10-PCS | Performed by: FAMILY MEDICINE

## 2018-06-18 PROCEDURE — 77010033678 HC OXYGEN DAILY

## 2018-06-18 PROCEDURE — 82803 BLOOD GASES ANY COMBINATION: CPT

## 2018-06-18 PROCEDURE — 36600 WITHDRAWAL OF ARTERIAL BLOOD: CPT

## 2018-06-18 RX ORDER — SODIUM CHLORIDE 0.9 % (FLUSH) 0.9 %
20 SYRINGE (ML) INJECTION AS NEEDED
Status: DISCONTINUED | OUTPATIENT
Start: 2018-06-18 | End: 2018-06-21 | Stop reason: HOSPADM

## 2018-06-18 RX ORDER — SENNOSIDES 25 MG/1
TABLET, FILM COATED ORAL
COMMUNITY

## 2018-06-18 RX ORDER — VANCOMYCIN 2 GRAM/500 ML IN 0.9 % SODIUM CHLORIDE INTRAVENOUS
2000 EVERY 12 HOURS
Status: DISCONTINUED | OUTPATIENT
Start: 2018-06-18 | End: 2018-06-19

## 2018-06-18 RX ORDER — HEPARIN 100 UNIT/ML
600 SYRINGE INTRAVENOUS AS NEEDED
Status: DISCONTINUED | OUTPATIENT
Start: 2018-06-18 | End: 2018-06-21 | Stop reason: HOSPADM

## 2018-06-18 RX ORDER — HEPARIN 100 UNIT/ML
600 SYRINGE INTRAVENOUS EVERY 8 HOURS
Status: DISCONTINUED | OUTPATIENT
Start: 2018-06-18 | End: 2018-06-21 | Stop reason: HOSPADM

## 2018-06-18 RX ORDER — SODIUM CHLORIDE 0.9 % (FLUSH) 0.9 %
20 SYRINGE (ML) INJECTION EVERY 8 HOURS
Status: DISCONTINUED | OUTPATIENT
Start: 2018-06-18 | End: 2018-06-21 | Stop reason: HOSPADM

## 2018-06-18 RX ADMIN — SODIUM CHLORIDE 125 ML/HR: 900 INJECTION, SOLUTION INTRAVENOUS at 13:53

## 2018-06-18 RX ADMIN — PREGABALIN 100 MG: 50 CAPSULE ORAL at 17:11

## 2018-06-18 RX ADMIN — SODIUM CHLORIDE 125 ML/HR: 900 INJECTION, SOLUTION INTRAVENOUS at 21:50

## 2018-06-18 RX ADMIN — INSULIN HUMAN 2 UNITS: 100 INJECTION, SOLUTION PARENTERAL at 17:12

## 2018-06-18 RX ADMIN — SODIUM CHLORIDE, PRESERVATIVE FREE 600 UNITS: 5 INJECTION INTRAVENOUS at 14:00

## 2018-06-18 RX ADMIN — VANCOMYCIN HYDROCHLORIDE 2000 MG: 10 INJECTION, POWDER, LYOPHILIZED, FOR SOLUTION INTRAVENOUS at 08:31

## 2018-06-18 RX ADMIN — PIPERACILLIN SODIUM AND TAZOBACTAM SODIUM 3.38 G: 3; .375 INJECTION, POWDER, LYOPHILIZED, FOR SOLUTION INTRAVENOUS at 05:29

## 2018-06-18 RX ADMIN — FAMOTIDINE 20 MG: 20 TABLET ORAL at 08:31

## 2018-06-18 RX ADMIN — Medication 5 ML: at 06:00

## 2018-06-18 RX ADMIN — PIPERACILLIN SODIUM AND TAZOBACTAM SODIUM 3.38 G: 3; .375 INJECTION, POWDER, LYOPHILIZED, FOR SOLUTION INTRAVENOUS at 13:08

## 2018-06-18 RX ADMIN — Medication 20 ML: at 15:09

## 2018-06-18 RX ADMIN — SODIUM CHLORIDE, PRESERVATIVE FREE 600 UNITS: 5 INJECTION INTRAVENOUS at 21:34

## 2018-06-18 RX ADMIN — Medication 5 ML: at 13:54

## 2018-06-18 RX ADMIN — VANCOMYCIN HYDROCHLORIDE 2000 MG: 10 INJECTION, POWDER, LYOPHILIZED, FOR SOLUTION INTRAVENOUS at 20:24

## 2018-06-18 RX ADMIN — INSULIN HUMAN 4 UNITS: 100 INJECTION, SOLUTION PARENTERAL at 08:31

## 2018-06-18 RX ADMIN — INSULIN HUMAN 4 UNITS: 100 INJECTION, SOLUTION PARENTERAL at 13:08

## 2018-06-18 RX ADMIN — FAMOTIDINE 20 MG: 20 TABLET ORAL at 20:24

## 2018-06-18 RX ADMIN — Medication 20 ML: at 21:34

## 2018-06-18 RX ADMIN — PIPERACILLIN SODIUM AND TAZOBACTAM SODIUM 3.38 G: 3; .375 INJECTION, POWDER, LYOPHILIZED, FOR SOLUTION INTRAVENOUS at 20:32

## 2018-06-18 RX ADMIN — HEPARIN SODIUM 5000 UNITS: 5000 INJECTION, SOLUTION INTRAVENOUS; SUBCUTANEOUS at 22:34

## 2018-06-18 RX ADMIN — HEPARIN SODIUM 5000 UNITS: 5000 INJECTION, SOLUTION INTRAVENOUS; SUBCUTANEOUS at 08:51

## 2018-06-18 RX ADMIN — Medication 5 ML: at 21:34

## 2018-06-18 RX ADMIN — DULOXETINE HYDROCHLORIDE 30 MG: 30 CAPSULE, DELAYED RELEASE ORAL at 08:31

## 2018-06-18 RX ADMIN — ACETAMINOPHEN 650 MG: 325 TABLET ORAL at 16:19

## 2018-06-18 RX ADMIN — INSULIN HUMAN 6 UNITS: 100 INJECTION, SOLUTION PARENTERAL at 21:39

## 2018-06-18 RX ADMIN — HEPARIN SODIUM 5000 UNITS: 5000 INJECTION, SOLUTION INTRAVENOUS; SUBCUTANEOUS at 15:09

## 2018-06-18 RX ADMIN — LEVOTHYROXINE SODIUM 100 MCG: 100 TABLET ORAL at 06:01

## 2018-06-18 NOTE — CONSULTS
CONSULT NOTE    Dayanna Bains    2018    Date of Admission:  2018    The patient's chart is reviewed and the patient is discussed with the staff. Subjective:     Patient is a 41 y.o.  female seen and evaluated at the request of Dr. Marcela Grace for the evaluation of respiratory acidosis. She was admitted last night with altered mental status and acute renal failure, she had ABG- 7.26/40/65 and she was placed on BIPAP. She was also hypoglycemic and hypotensive. She was given narcan and she woke up. She apparently was placed on new BP medication last week and her renal function was normal. She taked chronic pain meds . She says she has home sleep study maybe 2 years ago and it did craig show JEFF.              .      Review of Systems  A comprehensive review of systems was negative except for that written in the HPI. Patient Active Problem List   Diagnosis Code    Sepsis ([address] A41.9    Uncontrolled type 2 diabetes mellitus, without long-term current use of insulin ([address] E11.65    Acquired hypothyroidism E03.9    ARF (acute renal failure) ([address] N17.9    Hypoglycemia E16.2    Unresponsive R41.89    Respiratory acidosis E87.2    Morbid obesity due to excess calories (HCC) E66.01    JEFF (obstructive sleep apnea) likely G47.33       . Prior to Admission Medications   Prescriptions Last Dose Informant Patient Reported? Taking? DULoxetine (CYMBALTA) 60 mg capsule   Yes No   Sig: Take 60 mg by mouth daily. Liraglutide (VICTOZA) 0.6 mg/0.1 mL (18 mg/3 mL) pnij   Yes No   Si.6 mg by SubCUTAneous route daily. atorvastatin (LIPITOR) 40 mg tablet   Yes No   Sig: Take 40 mg by mouth daily. benzoyl peroxide-erythromycin (BENZAMYCIN) 3-5 % topical gel   Yes No   Sig: Apply  to affected area two (2) times a day.    diclofenac (VOLTAREN) 1 % gel   Yes No   Sig: Apply  to affected area four (4) times daily as needed for Pain.   exenatide microspheres 2 mg/0.85 mL atIn   Yes No   Sig: 2 mg by SubCUTAneous route every seven (7) days. fenofibrate micronized (LOFIBRA) 67 mg capsule   Yes No   Sig: Take 67 mg by mouth daily. fluticasone (FLONASE) 50 mcg/actuation nasal spray   Yes No   Si Sprays by Both Nostrils route daily. insulin glargine 300 unit/mL (1.5 mL) inpn   Yes No   Si Units by SubCUTAneous route nightly. insulin lispro (HUMALOG) 100 unit/mL kwikpen   Yes No   Si Units by SubCUTAneous route Before breakfast, lunch, and dinner. levothyroxine (SYNTHROID) 75 mcg tablet   Yes No   Sig: Take 100 mcg by mouth Daily (before breakfast). losartan-hydroCHLOROthiazide (HYZAAR) 50-12.5 mg per tablet   Yes No   Sig: Take 1 Tab by mouth daily. metFORMIN ER (GLUCOPHAGE XR) 500 mg tablet   Yes No   Sig: Take 1,000 mg by mouth two (2) times a day. morphine IR (MS IR) 15 mg tablet   Yes No   Sig: Take 15 mg by mouth two (2) times a day. ondansetron (ZOFRAN ODT) 4 mg disintegrating tablet   Yes No   Sig: Take 4 mg by mouth every eight (8) hours as needed for Nausea. pregabalin (LYRICA) 150 mg capsule   Yes No   Sig: Take 150 mg by mouth three (3) times daily. tiZANidine (ZANAFLEX) 4 mg tablet   Yes No   Sig: Take 4 mg by mouth three (3) times daily as needed.       Facility-Administered Medications: None       Past Medical History:   Diagnosis Date    Arthritis     Chronic pain     back    Diabetes ([address]     Type 2, av fastin glucose 214, can tell when glucose lower than50    Endocrine disease     Gastrointestinal disorder     reflux    GERD (gastroesophageal reflux disease)     Infectious disease     MRSA    JEFF (obstructive sleep apnea) likely 2018    Other ill-defined conditions(799.89)     ruptured disc, thyroid, platelet disorder    Psychiatric disorder     Thyroid disease     hypothyroidism     Past Surgical History:   Procedure Laterality Date    HX GYN      ovarian cyst drained    HX ORTHOPAEDIC  ,     back x 2     Social History Social History    Marital status:      Spouse name: N/A    Number of children: N/A    Years of education: N/A     Occupational History    Not on file.      Social History Main Topics    Smoking status: Never Smoker    Smokeless tobacco: Never Used    Alcohol use No    Drug use: No    Sexual activity: No     Other Topics Concern    Not on file     Social History Narrative     Family History   Problem Relation Age of Onset    Diabetes Mother     Cancer Father     Hypertension Father     Diabetes Father      Allergies   Allergen Reactions    Codeine Other (comments)     Lupie; not a problem       Current Facility-Administered Medications   Medication Dose Route Frequency    vancomycin (VANCOCIN) 2000 mg in  ml infusion  2,000 mg IntraVENous Q12H    [START ON 6/19/2018] VANCOMYCIN INFORMATION NOTE   Other ONCE    DULoxetine (CYMBALTA) capsule 30 mg  30 mg Oral DAILY    HYDROcodone-acetaminophen (NORCO) 5-325 mg per tablet 1 Tab  1 Tab Oral Q12H    levothyroxine (SYNTHROID) tablet 100 mcg  100 mcg Oral ACB    pregabalin (LYRICA) capsule 100 mg  100 mg Oral BID    sodium chloride (NS) flush 5 mL  5 mL InterCATHeter Q8H    sodium chloride (NS) flush 5-10 mL  5-10 mL InterCATHeter PRN    insulin regular (NOVOLIN R, HUMULIN R) injection   SubCUTAneous AC&HS    dextrose 40% (GLUTOSE) oral gel 1 Tube  15 g Oral PRN    glucagon (GLUCAGEN) injection 1 mg  1 mg IntraMUSCular PRN    dextrose (D50W) injection syrg 12.5-25 g  25-50 mL IntraVENous PRN    0.9% sodium chloride infusion  125 mL/hr IntraVENous CONTINUOUS    acetaminophen (TYLENOL) tablet 650 mg  650 mg Oral Q4H PRN    famotidine (PEPCID) tablet 20 mg  20 mg Oral Q12H    heparin (porcine) injection 5,000 Units  5,000 Units SubCUTAneous Q8H    piperacillin-tazobactam (ZOSYN) 3.375 g in 0.9% sodium chloride (MBP/ADV) 100 mL  3.375 g IntraVENous Q8H         Objective:     Vitals:    06/18/18 0519 06/18/18 0534 06/18/18 0040 06/18/18 0745   BP: 100/68 119/77     Pulse: (!) 119 (!) 120     Resp: 16 25     Temp:       SpO2: 98% 98% 99% 100%   Weight:       Height:           PHYSICAL EXAM     Constitutional:  the patient is well developed and in no acute distress  EENMT:  Sclera clear, pupils equal, oral mucosa moist  Respiratory: distant  Cardiovascular:  RRR without M,G,R  Gastrointestinal: soft and non-tender; with positive bowel sounds. Musculoskeletal: warm without cyanosis. There is plus 1 lower leg edema.   Skin:  no jaundice or rashes, no wounds   Neurologic: no gross neuro deficits     Psychiatric:  Somnolent     CXR:        Recent Labs      06/18/18 0310 06/17/18 2020   WBC  15.2*  15.9*   HGB  11.1*  11.5*   HCT  34.0*  37.2   PLT  261  288     Recent Labs      06/18/18   0310  06/17/18   2302  06/17/18 2020   NA  134*  132*  131*   K  4.8  4.2  3.8   CL  98  99  96*   GLU  177*  70  54*   CO2  26  25  28   BUN  33*  38*  37*   CREA  2.46*  3.26*  3.63*   MG  2.3   --   2.3   PHOS  5.3*   --   6.3*   CA  8.3  8.1*  9.4   ALB  3.1*   --   3.5   TBILI  0.4   --   0.5   ALT  40   --   43   SGOT  37   --   25     Recent Labs      06/18/18   0735  06/18/18   0412  06/17/18   2328   PH  7.30*  7.27*  7.32*   PCO2  50*  53*  45   PO2  119*  82  77*   HCO3  24  24  22     Recent Labs      06/18/18   0310  06/17/18   2302   LAC  0.7  1.4       Assessment:  (Medical Decision Making)     Hospital Problems  Never Reviewed          Codes Class Noted POA    JEFF (obstructive sleep apnea) likely ICD-10-CM: G47.33  ICD-9-CM: 327.23  6/18/2018 Unknown        ARF (acute renal failure) ([email or web address] ICD-10-CM: N17.9  ICD-9-CM: 584.9  6/17/2018 Yes        Hypoglycemia ICD-10-CM: E16.2  ICD-9-CM: 251.2  6/17/2018 Yes        * (Principal)Unresponsive ICD-10-CM: R41.89  ICD-9-CM: 780.09  6/17/2018 Yes        Respiratory acidosis ICD-10-CM: T87.4  ICD-9-CM: 276.2  6/17/2018 Yes        Morbid obesity due to excess calories (HCC) (Chronic) ICD-10-CM: E66.01  ICD-9-CM: 278.01  6/17/2018 Yes        Sepsis ([facility] ICD-10-CM: A41.9  ICD-9-CM: 038.9, 995.91  4/1/2016 Yes        Uncontrolled type 2 diabetes mellitus, without long-term current use of insulin (HCC) (Chronic) ICD-10-CM: E11.65  ICD-9-CM: 250.02  4/1/2016 Yes              Plan:  (Medical Decision Making)     -- she will need PSG as outpatient  - agree with iv fluids, probably her pain med and renal failure caused her AMS ,. She likely has underlying JEFF and she agrees to be tested. - can have BIPAP at night for now    More than 50% of the time documented was spent in face-to-face contact with the patient and in the care of the patient on the floor/unit where the patient is located. Thank you very much for this referral.  We appreciate the opportunity to participate in this patient's care. Will follow along with above stated plan.     Jaydon Mak MD

## 2018-06-18 NOTE — ED NOTES
TRANSFER - OUT REPORT:    Verbal report given to Debi on Dayanna M Poor  being transferred to [facility] for routine progression of care       Report consisted of patients Situation, Background, Assessment and   Recommendations(SBAR). Information from the following report(s) SBAR, ED Summary and MAR was reviewed with the receiving nurse. Lines:   Peripheral IV 06/17/18 (Active)   Site Assessment Clean, dry, & intact 6/17/2018  8:18 PM   Dressing Status Clean, dry, & intact 6/17/2018  8:18 PM       Intraosseous Line 06/17/18 Right;[place] (Active)   Site Assessment Clean, dry, & intact 6/17/2018  8:19 PM   Dressing Status Clean, dry, & intact 6/17/2018  8:19 PM   Status Infusing 6/17/2018  8:19 PM        Opportunity for questions and clarification was provided.       Patient transported with:   Monitor  O2 @ 15 liters  Registered Nurse  [facility]

## 2018-06-18 NOTE — PROGRESS NOTES
Care Management Interventions  Mode of Transport at Discharge: Other (see comment)  Transition of Care Consult (CM Consult): Other  Current Support Network: Lives with Spouse  Confirm Follow Up Transport: Family  Plan discussed with Pt/Family/Caregiver: Yes  Freedom of Choice Offered: Yes  Discharge Location  Discharge Placement: Home  Saw pt in interdisciplinarily rounds, plan of care and discharge date/ location discussed. Per the hospitalitis plan to continue to monitor pt progress, PT will be ordered when pt is more stable. Pt lives at home with  and is indep with ADL's.

## 2018-06-18 NOTE — PROGRESS NOTES
Initial visit by  to convey care and concern and encourage patient that  services are available if desired. Visited with Ms. Bains and her spouse, empathically listened to recent events and offered spiritual interventions, including prayer. Ms. Bains stated that her vision had improved since arriving to hospital. Patient's spouse Jennifer Richards identified himself as a  during the visit and he was very engaged in our conversation about Ms. Bains. He expressed appreciation or the visit. Provided business card for future reference.      Hayden Allen 69  Board Certified

## 2018-06-18 NOTE — PROGRESS NOTES
Hospitalist Progress Note     Admit Date:  2018  7:52 PM   Name:  Saira Bains   Age:  41 y.o.  :  1980   MRN:  [id number]   PCP:  Kalee Garay MD  Treatment Team: Attending Provider: Osei Grubbs MD; Consulting Provider: Dutch Muñoz MD; Consulting Provider: Nehemias Aponte MD  Patient is a 41 y.o. female who presents to the ER due to being essentially unresponsive at home. In ER, she was noted to be minimally responsive, hypoglycemic, hypotensive, hypoxic. She was hard to get an IV and a IO had to be placed. Family reports recent abdominal pain a few days ago; unknown if n/v.  Spouse noted that she has been \"nodding off\"/\"passing out\" a lot lately. ABG showed her to have respiratory acidosis with hypoxia and hypercapnia. Improved mentation after narcan. Subjective:   18  Pt on bipap-  at bedside-c/o chronic shoulder pain- able to converse appropriately.       Objective:   Patient Vitals for the past 24 hrs:   Temp Pulse Resp BP SpO2   18 1701 - (!) 116 23 135/59 91 %   18 1619 (!) 101.9 °F (38.8 °C) (!) 110 20 127/52 96 %   18 1517 - (!) 123 14 105/41 98 %   18 1511 99.8 °F (37.7 °C) - - - -   18 1436 - (!) 124 - 120/65 95 %   18 1406 - (!) 124 11 119/83 95 %   18 1336 - (!) 122 - 133/58 96 %   18 1335 - - 22 - -   18 1235 99.6 °F (37.6 °C) (!) 117 20 125/57 97 %   18 1131 - (!) 114 18 99/74 100 %   18 1100 - (!) 110 23 (!) 86/55 100 %   18 1030 - (!) 109 23 93/67 100 %   18 1019 - - - - 98 %   18 1000 - (!) 114 23 90/45 99 %   18 0930 - (!) 115 28 111/51 97 %   18 0900 - (!) 110 21 93/65 94 %   18 0830 - (!) 114 (!) 32 97/51 98 %   18 0800 - (!) 110 10 91/49 99 %   18 0745 - - - - 100 %   18 0727 - - - - 99 %   18 0649 98.9 °F (37.2 °C) (!) 114 11 (!) 82/52 99 %   18 0534 - (!) 120 25 119/77 98 %   18 0519 - (!) 119 16 100/68 98 %   06/18/18 0504 - (!) 119 12 113/63 98 %   06/18/18 0449 - (!) 118 24 116/86 97 %   06/18/18 0446 - - - - 98 %   06/18/18 0434 - (!) 122 23 99/58 97 %   06/18/18 0404 98.8 °F (37.1 °C) (!) 120 13 107/61 95 %   06/18/18 0349 - (!) 120 19 101/68 96 %   06/18/18 0319 - (!) 119 15 130/70 95 %   06/18/18 0304 - (!) 123 13 131/68 94 %   06/18/18 0249 - (!) 122 18 118/71 95 %   06/18/18 0234 - (!) 123 14 131/68 95 %   06/18/18 0219 - (!) 120 19 122/74 95 %   06/18/18 0204 - (!) 123 14 133/62 94 %   06/18/18 0149 - (!) 122 15 106/69 95 %   06/18/18 0134 - (!) 119 15 99/74 94 %   06/18/18 0119 - (!) 122 22 (!) 79/63 95 %   06/18/18 0104 - (!) 116 22 (!) 77/61 93 %   06/18/18 0049 - (!) 120 (!) 37 (!) 78/65 93 %   06/18/18 0034 - (!) 121 19 (!) 84/60 93 %   06/18/18 0019 - (!) 118 19 - 92 %   06/18/18 0016 - (!) 116 (!) 36 93/47 93 %   06/17/18 2351 98.5 °F (36.9 °C) (!) 114 22 (!) 87/61 95 %   06/17/18 2335 - (!) 118 30 (!) 87/66 98 %   06/17/18 2315 - (!) 119 21 (!) 83/50 94 %   06/17/18 2254 98.9 °F (37.2 °C) - - - -   06/17/18 2236 98.9 °F (37.2 °C) (!) 116 16 96/50 94 %   06/17/18 2203 98.9 °F (37.2 °C) (!) 118 22 118/53 99 %   06/17/18 2056 - - - - 99 %   06/17/18 2031 - (!) 111 - 103/51 98 %   06/17/18 2024 - (!) 111 - (!) 81/46 97 %   06/17/18 2022 - (!) 116 - (!) 68/47 91 %   06/17/18 2020 - (!) 115 - (!) 78/35 94 %   06/17/18 2011 - (!) 119 - - (!) 79 %   06/17/18 2008 - - 10 101/48 -   06/17/18 1956 97 °F (36.1 °C) - 12 - -     Oxygen Therapy  O2 Sat (%): 91 % (06/18/18 1701)  Pulse via Oximetry: 117 beats per minute (06/18/18 1701)  O2 Device: Nasal cannula (06/18/18 1315)  O2 Flow Rate (L/min): 4 l/min (06/18/18 1315)  O2 Temperature: 89.6 °F (32 °C) (06/17/18 2056)  FIO2 (%): 35 % (06/18/18 1235)    Intake/Output Summary (Last 24 hours) at 06/18/18 1737  Last data filed at 06/18/18 1713   Gross per 24 hour   Intake          4639.67 ml   Output             5185 ml   Net          -545.33 ml         General: Well nourished. Alert. On bipap- not in resp distress  heent- normal  CV:   RRR. No murmur, rub, or gallop. Lungs:   Clear to auscultation bilaterally. No wheezing, rhonchi, or rales. Abdomen:   Soft, nontender, nondistended. Morbid obesity  Cns- moves all ext, no focal neurological deficits  Extremities: Warm and dry. No cyanosis or edema. Intraosseous access rt leg  Skin:     No rashes or jaundice. Data Review:  I have reviewed all labs, meds, telemetry events, and studies from the last 24 hours. Recent Results (from the past 24 hour(s))   GLUCOSE, POC    Collection Time: 06/17/18  7:55 PM   Result Value Ref Range    Glucose (POC) 51 (L) 65 - 100 mg/dL   EKG, 12 LEAD, INITIAL    Collection Time: 06/17/18  8:15 PM   Result Value Ref Range    Ventricular Rate 118 BPM    Atrial Rate 118 BPM    P-R Interval 136 ms    QRS Duration 82 ms    Q-T Interval 310 ms    QTC Calculation (Bezet) 434 ms    Calculated P Axis 55 degrees    Calculated R Axis 98 degrees    Calculated T Axis 23 degrees    Diagnosis       !! AGE AND GENDER SPECIFIC ECG ANALYSIS !! Sinus tachycardia  Rightward axis  Borderline ECG  When compared with ECG of 01-APR-2016 01:48,  No significant change was found  Confirmed by ST DANISH FRANCIS MD (), REDD SALINAS ([id number]) on 6/18/2018 9:21:12 AM     BLOOD GAS, ARTERIAL    Collection Time: 06/17/18  8:18 PM   Result Value Ref Range    pH 7.26 (L) 7.35 - 7.45      PCO2 50 (H) 35 - 45 mmHg    PO2 65 (L) 80 - 105 mmHg    BICARBONATE 22 22 - 26 mmol/L    BASE DEFICIT 5.3 (H) 0 - 2 mmol/L    TOTAL HEMOGLOBIN 12.3 11.7 - 15.0 GM/DL    O2 SAT 89 (L) 92 - 98.5 %    Arterial O2 Hgb 87.8 (L) 94 - 97 %    CARBOXYHEMOGLOBIN 0.9 0.5 - 1.5 %    METHEMOGLOBIN 0.3 0.0 - 1.5 %    DEOXYHEMOGLOBIN 11 (H) 0.0 - 5.0 %    SITE RR     ALLENS TEST POSITIVE      MODE NC     O2 FLOW 2.00 L/min    Respiratory comment: Dr. Nelsy Tobin at 6 17 2018 8 23 33 PM. Read back.     GLUCOSE, POC    Collection Time: 06/17/18  8:18 PM   Result Value Ref Range    Glucose (POC) 114 (H) 65 - 100 mg/dL   CBC WITH AUTOMATED DIFF    Collection Time: 06/17/18  8:20 PM   Result Value Ref Range    WBC 15.9 (H) 4.3 - 11.1 K/uL    RBC 4.59 4.05 - 5.25 M/uL    HGB 11.5 (L) 11.7 - 15.4 g/dL    HCT 37.2 35.8 - 46.3 %    MCV 81.0 79.6 - 97.8 FL    MCH 25.1 (L) 26.1 - 32.9 PG    MCHC 30.9 (L) 31.4 - 35.0 g/dL    RDW 15.8 (H) 11.9 - 14.6 %    PLATELET 641 768 - 513 K/uL    MPV 11.0 10.8 - 14.1 FL    DF AUTOMATED      NEUTROPHILS 74 43 - 78 %    LYMPHOCYTES 19 13 - 44 %    MONOCYTES 5 4.0 - 12.0 %    EOSINOPHILS 1 0.5 - 7.8 %    BASOPHILS 0 0.0 - 2.0 %    IMMATURE GRANULOCYTES 1 0.0 - 5.0 %    ABS. NEUTROPHILS 11.9 (H) 1.7 - 8.2 K/UL    ABS. LYMPHOCYTES 3.0 0.5 - 4.6 K/UL    ABS. MONOCYTES 0.8 0.1 - 1.3 K/UL    ABS. EOSINOPHILS 0.2 0.0 - 0.8 K/UL    ABS. BASOPHILS 0.0 0.0 - 0.2 K/UL    ABS. IMM. GRANS. 0.1 0.0 - 0.5 K/UL   METABOLIC PANEL, COMPREHENSIVE    Collection Time: 06/17/18  8:20 PM   Result Value Ref Range    Sodium 131 (L) 136 - 145 mmol/L    Potassium 3.8 3.5 - 5.1 mmol/L    Chloride 96 (L) 98 - 107 mmol/L    CO2 28 21 - 32 mmol/L    Anion gap 7 7 - 16 mmol/L    Glucose 54 (L) 65 - 100 mg/dL    BUN 37 (H) 6 - 23 MG/DL    Creatinine 3.63 (H) 0.6 - 1.0 MG/DL    GFR est AA 18 (L) >60 ml/min/1.73m2    GFR est non-AA 15 (L) >60 ml/min/1.73m2    Calcium 9.4 8.3 - 10.4 MG/DL    Bilirubin, total 0.5 0.2 - 1.1 MG/DL    ALT (SGPT) 43 12 - 65 U/L    AST (SGOT) 25 15 - 37 U/L    Alk.  phosphatase 123 50 - 136 U/L    Protein, total 7.4 6.3 - 8.2 g/dL    Albumin 3.5 3.5 - 5.0 g/dL    Globulin 3.9 (H) 2.3 - 3.5 g/dL    A-G Ratio 0.9 (L) 1.2 - 3.5     MAGNESIUM    Collection Time: 06/17/18  8:20 PM   Result Value Ref Range    Magnesium 2.3 1.8 - 2.4 mg/dL   PHOSPHORUS    Collection Time: 06/17/18  8:20 PM   Result Value Ref Range    Phosphorus 6.3 (H) 2.5 - 4.5 MG/DL   CK    Collection Time: 06/17/18  8:20 PM   Result Value Ref Range     (H) 21 - 215 U/L   LIPASE Collection Time: 06/17/18  8:20 PM   Result Value Ref Range    Lipase 86 73 - 393 U/L   POC TROPONIN-I    Collection Time: 06/17/18  8:22 PM   Result Value Ref Range    Troponin-I (POC) 0 (L) 0.02 - 0.05 ng/ml   POC LACTIC ACID    Collection Time: 06/17/18  8:26 PM   Result Value Ref Range    Lactic Acid (POC) 2.6 (H) 0.5 - 1.9 mmol/L   CULTURE, BLOOD    Collection Time: 06/17/18  9:12 PM   Result Value Ref Range    Special Requests: RIGHT  CENTRAL        Culture result: NO GROWTH AFTER 15 HOURS     URINALYSIS W/ RFLX MICROSCOPIC    Collection Time: 06/17/18  9:13 PM   Result Value Ref Range    Color YELLOW      Appearance TURBID      Specific gravity 1.007 1.001 - 1.023      pH (UA) 5.0 5.0 - 9.0      Protein 30 (A) NEG mg/dL    Glucose NEGATIVE  mg/dL    Ketone NEGATIVE  NEG mg/dL    Bilirubin NEGATIVE  NEG      Blood NEGATIVE  NEG      Urobilinogen 0.2 0.2 - 1.0 EU/dL    Nitrites NEGATIVE  NEG      Leukocyte Esterase NEGATIVE  NEG      WBC 3-5 0 /hpf    RBC 3-5 0 /hpf    Epithelial cells 3-5 0 /hpf    Bacteria 0 0 /hpf    Crystals, urine OCCASIONAL 0 /LPF   CULTURE, BLOOD    Collection Time: 06/17/18  9:14 PM   Result Value Ref Range    Special Requests: LEFT  Antecubital        Culture result: NO GROWTH AFTER 15 HOURS     METABOLIC PANEL, BASIC    Collection Time: 06/17/18 11:02 PM   Result Value Ref Range    Sodium 132 (L) 136 - 145 mmol/L    Potassium 4.2 3.5 - 5.1 mmol/L    Chloride 99 98 - 107 mmol/L    CO2 25 21 - 32 mmol/L    Anion gap 8 7 - 16 mmol/L    Glucose 70 65 - 100 mg/dL    BUN 38 (H) 6 - 23 MG/DL    Creatinine 3.26 (H) 0.6 - 1.0 MG/DL    GFR est AA 21 (L) >60 ml/min/1.73m2    GFR est non-AA 17 (L) >60 ml/min/1.73m2    Calcium 8.1 (L) 8.3 - 10.4 MG/DL   LACTIC ACID    Collection Time: 06/17/18 11:02 PM   Result Value Ref Range    Lactic acid 1.4 0.4 - 2.0 MMOL/L   PROCALCITONIN    Collection Time: 06/17/18 11:02 PM   Result Value Ref Range    Procalcitonin 0.3 ng/mL   BLOOD GAS, ARTERIAL Collection Time: 06/17/18 11:28 PM   Result Value Ref Range    pH 7.32 (L) 7.35 - 7.45      PCO2 45 35 - 45 mmHg    PO2 77 (L) 80 - 105 mmHg    BICARBONATE 22 22 - 26 mmol/L    BASE DEFICIT 3.8 (H) 0 - 2 mmol/L    SITE RR     ALLENS TEST POSITIVE      MODE NC     O2 FLOW 4.00 L/min    Respiratory comment: Sherry DIAMOND at 6 17 2018 11 34 30 PM. Read back. GLUCOSE, POC    Collection Time: 06/18/18  1:54 AM   Result Value Ref Range    Glucose (POC) 132 (H) 65 - 100 mg/dL   HEMOGLOBIN A1C WITH EAG    Collection Time: 06/18/18  3:10 AM   Result Value Ref Range    Hemoglobin A1c 8.4 (H) 4.8 - 6.0 %    Est. average glucose 135 mg/dL   METABOLIC PANEL, COMPREHENSIVE    Collection Time: 06/18/18  3:10 AM   Result Value Ref Range    Sodium 134 (L) 136 - 145 mmol/L    Potassium 4.8 3.5 - 5.1 mmol/L    Chloride 98 98 - 107 mmol/L    CO2 26 21 - 32 mmol/L    Anion gap 10 7 - 16 mmol/L    Glucose 177 (H) 65 - 100 mg/dL    BUN 33 (H) 6 - 23 MG/DL    Creatinine 2.46 (H) 0.6 - 1.0 MG/DL    GFR est AA 28 (L) >60 ml/min/1.73m2    GFR est non-AA 23 (L) >60 ml/min/1.73m2    Calcium 8.3 8.3 - 10.4 MG/DL    Bilirubin, total 0.4 0.2 - 1.1 MG/DL    ALT (SGPT) 40 12 - 65 U/L    AST (SGOT) 37 15 - 37 U/L    Alk.  phosphatase 108 50 - 136 U/L    Protein, total 6.9 6.3 - 8.2 g/dL    Albumin 3.1 (L) 3.5 - 5.0 g/dL    Globulin 3.8 (H) 2.3 - 3.5 g/dL    A-G Ratio 0.8 (L) 1.2 - 3.5     MAGNESIUM    Collection Time: 06/18/18  3:10 AM   Result Value Ref Range    Magnesium 2.3 1.8 - 2.4 mg/dL   PHOSPHORUS    Collection Time: 06/18/18  3:10 AM   Result Value Ref Range    Phosphorus 5.3 (H) 2.5 - 4.5 MG/DL   CBC WITH AUTOMATED DIFF    Collection Time: 06/18/18  3:10 AM   Result Value Ref Range    WBC 15.2 (H) 4.3 - 11.1 K/uL    RBC 4.24 4.05 - 5.25 M/uL    HGB 11.1 (L) 11.7 - 15.4 g/dL    HCT 34.0 (L) 35.8 - 46.3 %    MCV 80.2 79.6 - 97.8 FL    MCH 26.2 26.1 - 32.9 PG    MCHC 32.6 31.4 - 35.0 g/dL    RDW 16.0 (H) 11.9 - 14.6 %    PLATELET 886 215 - 706 K/uL    MPV 11.0 10.8 - 14.1 FL    DF AUTOMATED      NEUTROPHILS 78 43 - 78 %    LYMPHOCYTES 12 (L) 13 - 44 %    MONOCYTES 10 4.0 - 12.0 %    EOSINOPHILS 0 (L) 0.5 - 7.8 %    BASOPHILS 0 0.0 - 2.0 %    IMMATURE GRANULOCYTES 0 0.0 - 5.0 %    ABS. NEUTROPHILS 11.6 (H) 1.7 - 8.2 K/UL    ABS. LYMPHOCYTES 1.8 0.5 - 4.6 K/UL    ABS. MONOCYTES 1.6 (H) 0.1 - 1.3 K/UL    ABS. EOSINOPHILS 0.1 0.0 - 0.8 K/UL    ABS. BASOPHILS 0.0 0.0 - 0.2 K/UL    ABS. IMM. GRANS. 0.1 0.0 - 0.5 K/UL   LACTIC ACID    Collection Time: 06/18/18  3:10 AM   Result Value Ref Range    Lactic acid 0.7 0.4 - 2.0 MMOL/L   BLOOD GAS, ARTERIAL    Collection Time: 06/18/18  4:12 AM   Result Value Ref Range    pH 7.27 (L) 7.35 - 7.45      PCO2 53 (H) 35 - 45 mmHg    PO2 82 80 - 105 mmHg    BICARBONATE 24 22 - 26 mmol/L    BASE DEFICIT 3.9 (H) 0 - 2 mmol/L    SITE RR     ALLENS TEST POSITIVE      MODE NC     O2 FLOW 4.00 L/min    Respiratory comment: Dr. Mildred Carvajal at 6 18 2018 4 17 52 AM. Read back. BLOOD GAS, ARTERIAL    Collection Time: 06/18/18  7:35 AM   Result Value Ref Range    pH 7.30 (L) 7.35 - 7.45      PCO2 50 (H) 35 - 45 mmHg    PO2 119 (H) 80 - 105 mmHg    BICARBONATE 24 22 - 26 mmol/L    BASE DEFICIT 2.6 (H) 0 - 2 mmol/L    SITE LR     ALLENS TEST POSITIVE      MODE BIPAP 10 5     RATE 16.0      Respiratory comment: Sherry at 6 18 2018 7 40 34 AM. Read back.     GLUCOSE, POC    Collection Time: 06/18/18  7:43 AM   Result Value Ref Range    Glucose (POC) 235 (H) 65 - 100 mg/dL   GLUCOSE, POC    Collection Time: 06/18/18 11:08 AM   Result Value Ref Range    Glucose (POC) 249 (H) 65 - 100 mg/dL   GLUCOSE, POC    Collection Time: 06/18/18  5:11 PM   Result Value Ref Range    Glucose (POC) 199 (H) 65 - 100 mg/dL        All Micro Results     Procedure Component Value Units Date/Time    CULTURE, BLOOD [[id number]] Collected:  06/17/18 2112    Order Status:  Completed Specimen:  Blood from Blood Updated:  06/18/18 1252     Special Requests: -- RIGHT  CENTRAL       Culture result: NO GROWTH AFTER 15 HOURS       CULTURE, BLOOD [[id number]] Collected:  06/17/18 2110    Order Status:  Completed Specimen:  Blood from Blood Updated:  06/18/18 2920     Special Requests: --        LEFT  Antecubital       Culture result: NO GROWTH AFTER 15 HOURS             Current Meds:  Current Facility-Administered Medications   Medication Dose Route Frequency    vancomycin (VANCOCIN) 2000 mg in  ml infusion  2,000 mg IntraVENous Q12H    [START ON 6/19/2018] VANCOMYCIN INFORMATION NOTE   Other ONCE    sodium chloride (NS) flush 20 mL  20 mL InterCATHeter Q8H    heparin (porcine) pf 600 Units  600 Units InterCATHeter Q8H    sodium chloride (NS) flush 20 mL  20 mL InterCATHeter PRN    heparin (porcine) pf 600 Units  600 Units InterCATHeter PRN    DULoxetine (CYMBALTA) capsule 30 mg  30 mg Oral DAILY    HYDROcodone-acetaminophen (NORCO) 5-325 mg per tablet 1 Tab  1 Tab Oral Q12H    levothyroxine (SYNTHROID) tablet 100 mcg  100 mcg Oral ACB    pregabalin (LYRICA) capsule 100 mg  100 mg Oral BID    sodium chloride (NS) flush 5 mL  5 mL InterCATHeter Q8H    sodium chloride (NS) flush 5-10 mL  5-10 mL InterCATHeter PRN    insulin regular (NOVOLIN R, HUMULIN R) injection   SubCUTAneous AC&HS    dextrose 40% (GLUTOSE) oral gel 1 Tube  15 g Oral PRN    glucagon (GLUCAGEN) injection 1 mg  1 mg IntraMUSCular PRN    dextrose (D50W) injection syrg 12.5-25 g  25-50 mL IntraVENous PRN    0.9% sodium chloride infusion  125 mL/hr IntraVENous CONTINUOUS    acetaminophen (TYLENOL) tablet 650 mg  650 mg Oral Q4H PRN    famotidine (PEPCID) tablet 20 mg  20 mg Oral Q12H    heparin (porcine) injection 5,000 Units  5,000 Units SubCUTAneous Q8H    piperacillin-tazobactam (ZOSYN) 3.375 g in 0.9% sodium chloride (MBP/ADV) 100 mL  3.375 g IntraVENous Q8H       Other Studies (last 24 hours):  Xr Chest Port    Result Date: 6/17/2018  Portable AP semiupright chest dated 6/17/2018 at 2044 hours Comparison exam 4/1/2016 CLINICAL INFORMATION: Shortness of breath, syncope Heart is within normal in size and size. Mediastinum unremarkable. Pulmonary vascularity appears normal. Lungs are clear. No pleural effusion. IMPRESSION: No acute abnormality      Assessment and Plan:     Hospital Problems as of 6/18/2018  Never Reviewed          Codes Class Noted - Resolved POA    JEFF (obstructive sleep apnea) likely ICD-10-CM: G47.33  ICD-9-CM: 327.23  6/18/2018 - Present Unknown        ARF (acute renal failure) (HCC) ICD-10-CM: N17.9  ICD-9-CM: 584.9  6/17/2018 - Present Yes        Hypoglycemia ICD-10-CM: E16.2  ICD-9-CM: 251.2  6/17/2018 - Present Yes        * (Principal)Unresponsive ICD-10-CM: R41.89  ICD-9-CM: 780.09  6/17/2018 - Present Yes        Respiratory acidosis ICD-10-CM: E87.2  ICD-9-CM: 276.2  6/17/2018 - Present Yes        Morbid obesity due to excess calories (HCC) (Chronic) ICD-10-CM: E66.01  ICD-9-CM: 278.01  6/17/2018 - Present Yes        Sepsis ([facility] [address] ICD-10-CM: A41.9  ICD-9-CM: 038.9, 995.91  4/1/2016 - Present Yes        Uncontrolled type 2 diabetes mellitus, without long-term current use of insulin (HCC) (Chronic) ICD-10-CM: E11.65  ICD-9-CM: 250.02  4/1/2016 - Present Yes    Chronic pain          PLAN:    Unresponsiveness- improved- prob sec to combination of hypoglycemia,painmedication,hypercapnea. Hypercapnea/acidosis- on bipap. Pulmonary following. shane- improving-prob sec to bp medications- recent kidney functions on 6/5/18- normal at pcp office. Nephrology consulted  Sepsis/sirs- no source of infection- episodes of fever-- low blood pressure- on broad spectrum antibiotics. Hypotension- prob shane vs r/o infectious etiology  Dm type 2 - insulin- long acting meds(eg on once a week insulin)--hypoglycemic at presentations- improved now. Chronic pain- follows with pain management- meds held sec to low bp.   Morbid obesity    DC planning/Dispo:    DVT ppx:  heparin    Signed:  Connie Christine Jose Gross MD

## 2018-06-18 NOTE — PROGRESS NOTES
TRANSFER - IN REPORT:    Verbal report received from [place] pass on Dayanna Bains  being received from ED for routine progression of care      Report consisted of patients Situation, Background, Assessment and   Recommendations(SBAR). Information from the following report(s) SBAR, Kardex, ED Summary, MAR, Recent Results, Med Rec Status and Cardiac Rhythm Sinus Tach was reviewed with the receiving nurse. Opportunity for questions and clarification was provided. Assessment completed upon patients arrival to unit and care assumed.

## 2018-06-18 NOTE — H&P
HOSPITALIST H&P/CONSULT  NAME:  Juan Dash Poor   Age:  41 y.o.  :   1980   MRN:   [id number]  PCP: Linden García MD  Treatment Team: Attending Provider: Nick Amrao MD    Prior     CC: Reason for admission is: unresponsiveness    HPI:   Patient history was obtained from the ER provider prior to seeing the patient. Patient is a 41 y.o. female who presents to the ER due to being essentially unresponsive at home. In ER, she was noted to be minimally responsive, hypoglycemic, hypotensive, hypoxic. She was hard to get an IV and a IO had to be placed. Family reports recent abdominal pain a few days ago; unknown if n/v.  Spouse noted that she has been \"nodding off\"/\"passing out\" a lot lately. ABG showed her to have respiratory acidosis with hypoxia and hypercapnia. When I see her after she has been on optiflo, she is more alert already. No known fever/chills.       ROS:  Pt unable to fully participate due to confusion      Past Medical History:   Diagnosis Date    Arthritis     Chronic pain     back    Diabetes ([address]     Type 2, av fastin glucose 214, can tell when glucose lower than50    Endocrine disease     Gastrointestinal disorder     reflux    GERD (gastroesophageal reflux disease)     Infectious disease     MRSA    Other ill-defined conditions(799.89)     ruptured disc, thyroid, platelet disorder    Psychiatric disorder     Thyroid disease     hypothyroidism      Past Surgical History:   Procedure Laterality Date    HX GYN      ovarian cyst drained    HX ORTHOPAEDIC  ,     back x 2      Social History   Substance Use Topics    Smoking status: Never Smoker    Smokeless tobacco: Never Used    Alcohol use No      Family History   Problem Relation Age of Onset    Diabetes Mother     Cancer Father     Hypertension Father     Diabetes Father        FH Reviewed and non-contributory to admitting diagnosis    Allergies   Allergen Reactions    Codeine Other (comments) Jeni; not a problem      Prior to Admission Medications   Prescriptions Last Dose Informant Patient Reported? Taking? DULoxetine (CYMBALTA) 30 mg capsule   Yes No   Sig: Take 30 mg by mouth daily. HYDROcodone-acetaminophen (NORCO) 5-325 mg per tablet   No No   Sig: Take 1 Tab by mouth every twelve (12) hours. Max Daily Amount: 2 Tabs. diclofenac (VOLTAREN) 1 % gel   Yes No   Sig: Apply  to affected area four (4) times daily as needed for Pain.   glimepiride (AMARYL) 2 mg tablet   Yes No   Sig: Take 2 mg by mouth Before breakfast, lunch, and dinner. levothyroxine (SYNTHROID) 75 mcg tablet   Yes No   Sig: Take 100 mcg by mouth Daily (before breakfast). linagliptin (TRADJENTA) 5 mg tablet   Yes No   Sig: Take 5 mg by mouth daily. metFORMIN (GLUMETZA) 1,000 mg TG24 24 hour tablet   Yes No   Sig: Take  by mouth two (2) times a day. Indications: TYPE 2 DIABETES MELLITUS   pregabalin (LYRICA) 100 mg capsule   Yes No   Sig: Take 100 mg by mouth four (4) times daily. promethazine (PHENERGAN) 25 mg tablet   Yes No   Sig: Take 25 mg by mouth every six (6) hours as needed for Nausea. tiZANidine (ZANAFLEX) 2 mg capsule   Yes No   Sig: Take 2 mg by mouth three (3) times daily. zolpidem (AMBIEN) 10 mg tablet   Yes No   Sig: Take  by mouth nightly as needed for Sleep.       Facility-Administered Medications: None         Objective:   Patient Vitals for the past 24 hrs:   Temp Pulse Resp BP SpO2   18 2203 98.9 °F (37.2 °C) (!) 118 22 118/53 99 %   18 - - - - 99 %   18 - (!) 111 - 103/51 98 %   18 - (!) 111 - (!) 81/46 97 %   18 - (!) 116 - (!) 68/47 91 %   18 - (!) 115 - (!) 78/35 94 %   18 - (!) 119 - - (!) 79 %   18 - - 10 101/48 -   18 97 °F (36.1 °C) - 12 - -     No intake or output data in the 24 hours ending 18 2218   Temp (24hrs), Av °F (36.7 °C), Min:97 °F (36.1 °C), Max:98.9 °F (37.2 °C)    Oxygen Therapy  O2 Sat (%): 99 % (06/17/18 2203)  Pulse via Oximetry: 118 beats per minute (06/17/18 2203)  O2 Device: Heated; Hi flow nasal cannula (06/17/18 2056)  O2 Flow Rate (L/min): 50 l/min (06/17/18 2056)  O2 Temperature: 89.6 °F (32 °C) (06/17/18 2056)  FIO2 (%): 40 % (06/17/18 2056)   Body mass index is 63.24 kg/(m^2). Physical Exam:    General:    WD and WN, No apparent distress. Head:   Normocephalic, without obvious abnormality, atraumatic. Eyes:  PERRL; EOMI; sclera normal/non-icteric  ENT:  Hearing is normal.  Oropharynx is clear with tacky mucous membranes   Resp:    Clear to auscultation bilaterally. No Wheezing or Rhonchi. Resp are even and unlabored  Heart[de-identified]  Regular rate and rhythm,  no murmur,   No LE edema  Abdomen:   Soft, non-tender. Not distended. Bowel sounds normal.  hepato-splenomegaly cannot be assess due to obesity   Musc/SK: Muscle strength is good and tone normal; No cyanosis. No clubbing  Skin:     Texture, turgor normal. No significant rashes or lesions. Neurologic: CN II - XII are grossly intact - other than Eye exam as noted above  Psych: Alert and oriented x 4;  Judgement and insight are normal     Data Review:   Recent Results (from the past 24 hour(s))   GLUCOSE, POC    Collection Time: 06/17/18  7:55 PM   Result Value Ref Range    Glucose (POC) 51 (L) 65 - 100 mg/dL   BLOOD GAS, ARTERIAL    Collection Time: 06/17/18  8:18 PM   Result Value Ref Range    pH 7.26 (L) 7.35 - 7.45      PCO2 50 (H) 35 - 45 mmHg    PO2 65 (L) 80 - 105 mmHg    BICARBONATE 22 22 - 26 mmol/L    BASE DEFICIT 5.3 (H) 0 - 2 mmol/L    TOTAL HEMOGLOBIN 12.3 11.7 - 15.0 GM/DL    O2 SAT 89 (L) 92 - 98.5 %    Arterial O2 Hgb 87.8 (L) 94 - 97 %    CARBOXYHEMOGLOBIN 0.9 0.5 - 1.5 %    METHEMOGLOBIN 0.3 0.0 - 1.5 %    DEOXYHEMOGLOBIN 11 (H) 0.0 - 5.0 %    SITE RR     ALLENS TEST POSITIVE      MODE NC     O2 FLOW 2.00 L/min    Respiratory comment: Dr. Al Howell at 6 17 2018 8 23 33 PM. Read back.     GLUCOSE, POC Collection Time: 06/17/18  8:18 PM   Result Value Ref Range    Glucose (POC) 114 (H) 65 - 100 mg/dL   CBC WITH AUTOMATED DIFF    Collection Time: 06/17/18  8:20 PM   Result Value Ref Range    WBC 15.9 (H) 4.3 - 11.1 K/uL    RBC 4.59 4.05 - 5.25 M/uL    HGB 11.5 (L) 11.7 - 15.4 g/dL    HCT 37.2 35.8 - 46.3 %    MCV 81.0 79.6 - 97.8 FL    MCH 25.1 (L) 26.1 - 32.9 PG    MCHC 30.9 (L) 31.4 - 35.0 g/dL    RDW 15.8 (H) 11.9 - 14.6 %    PLATELET 342 313 - 552 K/uL    MPV 11.0 10.8 - 14.1 FL    DF AUTOMATED      NEUTROPHILS 74 43 - 78 %    LYMPHOCYTES 19 13 - 44 %    MONOCYTES 5 4.0 - 12.0 %    EOSINOPHILS 1 0.5 - 7.8 %    BASOPHILS 0 0.0 - 2.0 %    IMMATURE GRANULOCYTES 1 0.0 - 5.0 %    ABS. NEUTROPHILS 11.9 (H) 1.7 - 8.2 K/UL    ABS. LYMPHOCYTES 3.0 0.5 - 4.6 K/UL    ABS. MONOCYTES 0.8 0.1 - 1.3 K/UL    ABS. EOSINOPHILS 0.2 0.0 - 0.8 K/UL    ABS. BASOPHILS 0.0 0.0 - 0.2 K/UL    ABS. IMM. GRANS. 0.1 0.0 - 0.5 K/UL   METABOLIC PANEL, COMPREHENSIVE    Collection Time: 06/17/18  8:20 PM   Result Value Ref Range    Sodium 131 (L) 136 - 145 mmol/L    Potassium 3.8 3.5 - 5.1 mmol/L    Chloride 96 (L) 98 - 107 mmol/L    CO2 28 21 - 32 mmol/L    Anion gap 7 7 - 16 mmol/L    Glucose 54 (L) 65 - 100 mg/dL    BUN 37 (H) 6 - 23 MG/DL    Creatinine 3.63 (H) 0.6 - 1.0 MG/DL    GFR est AA 18 (L) >60 ml/min/1.73m2    GFR est non-AA 15 (L) >60 ml/min/1.73m2    Calcium 9.4 8.3 - 10.4 MG/DL    Bilirubin, total 0.5 0.2 - 1.1 MG/DL    ALT (SGPT) 43 12 - 65 U/L    AST (SGOT) 25 15 - 37 U/L    Alk.  phosphatase 123 50 - 136 U/L    Protein, total 7.4 6.3 - 8.2 g/dL    Albumin 3.5 3.5 - 5.0 g/dL    Globulin 3.9 (H) 2.3 - 3.5 g/dL    A-G Ratio 0.9 (L) 1.2 - 3.5     MAGNESIUM    Collection Time: 06/17/18  8:20 PM   Result Value Ref Range    Magnesium 2.3 1.8 - 2.4 mg/dL   PHOSPHORUS    Collection Time: 06/17/18  8:20 PM   Result Value Ref Range    Phosphorus 6.3 (H) 2.5 - 4.5 MG/DL   CK    Collection Time: 06/17/18  8:20 PM   Result Value Ref Range  (H) 21 - 215 U/L   LIPASE    Collection Time: 06/17/18  8:20 PM   Result Value Ref Range    Lipase 86 73 - 393 U/L   POC TROPONIN-I    Collection Time: 06/17/18  8:22 PM   Result Value Ref Range    Troponin-I (POC) 0 (L) 0.02 - 0.05 ng/ml   POC LACTIC ACID    Collection Time: 06/17/18  8:26 PM   Result Value Ref Range    Lactic Acid (POC) 2.6 (H) 0.5 - 1.9 mmol/L   URINALYSIS W/ RFLX MICROSCOPIC    Collection Time: 06/17/18  9:13 PM   Result Value Ref Range    Color YELLOW      Appearance TURBID      Specific gravity 1.007 1.001 - 1.023      pH (UA) 5.0 5.0 - 9.0      Protein 30 (A) NEG mg/dL    Glucose NEGATIVE  mg/dL    Ketone NEGATIVE  NEG mg/dL    Bilirubin NEGATIVE  NEG      Blood NEGATIVE  NEG      Urobilinogen 0.2 0.2 - 1.0 EU/dL    Nitrites NEGATIVE  NEG      Leukocyte Esterase NEGATIVE  NEG      WBC 3-5 0 /hpf    RBC 3-5 0 /hpf    Epithelial cells 3-5 0 /hpf    Bacteria 0 0 /hpf    Crystals, urine OCCASIONAL 0 /LPF     CXR Results  (Last 48 hours)               06/17/18 2037  XR CHEST PORT Final result    Impression:  IMPRESSION: No acute abnormality       Narrative:  Portable AP semiupright chest dated 6/17/2018 at 2044 hours       Comparison exam 4/1/2016       CLINICAL INFORMATION: Shortness of breath, syncope       Heart is within normal in size and size. Mediastinum unremarkable. Pulmonary   vascularity appears normal. Lungs are clear. No pleural effusion. CT Results  (Last 48 hours)    None              Assessment and Plan:      Active Hospital Problems    Diagnosis Date Noted    ARF (acute renal failure) ([facility] 06/17/2018    Hypoglycemia 06/17/2018    Unresponsive 06/17/2018    Respiratory acidosis 06/17/2018    Morbid obesity due to excess calories ([facility] 06/17/2018    Sepsis ([facility] 04/01/2016    Uncontrolled type 2 diabetes mellitus, without long-term current use of insulin ([facility] 04/01/2016     Principal Problem:    Unresponsive (6/17/2018)    Appears to be due to hypercapnea; is improving    Active Problems:    Sepsis ([address] (4/1/2016)    Meets criteria; but no noted infection: cont IV abx for now      Uncontrolled type 2 diabetes mellitus, without long-term current use of insulin ([address] (4/1/2016)    Home meds and SSI      ARF (acute renal failure) ([address] (6/17/2018)    IVF      Hypoglycemia (6/17/2018)    Probably due to not eating today +/- possible infection      Respiratory acidosis (6/17/2018)    Suspect hypoventilation syndrome;  Pt reports a past sleep study was inconclusive  Consult Pulm      Morbid obesity due to excess calories ([address] (2/99/2410)    Complicating issues      · PLAN   · Cont appropriate home meds (see MAR)  · Control symptoms (pain, n/v, fever, etc)  · Monitor appropriate labs   · DVT prophylaxis:  Lovenox  · Code status: Full  · Risk: high  · Anticipated DC needs:  · Estimated LOS:  Greater than 2 midnights  · Plans discussed with patient and/or caregiver; questions answered.       Med records reviewed if applicable; findings:     Critical care time if applicable:      Signed By: Vika Mckeon MD     June 17, 2018

## 2018-06-18 NOTE — PROGRESS NOTES
PICC Placement Note    PRE-PROCEDURE VERIFICATION  Correct Procedure: yes. Time out completed with assistant Roberta Morris RN and all persons present in agreement with time out. Correct Site:  yes  Temperature: Temp: 99.6 °F (37.6 °C), Temperature Source: Temp Source: Axillary  Recent Labs      06/18/18   0310   BUN  33*   CREA  2.46*   PLT  261   WBC  15.2*     Allergies: Codeine  Education materials for [facility] Care given to patient or family. PROCEDURE DETAIL  A double lumen PICC line was started for vascular access and desire for reliable access. The following documentation is in addition to the PICC properties in the lines/airways flowsheet :  Lot #: [id number]  xylocaine used: yes  Mid-Arm Circumference: 45 (cm)  Internal Catheter Length: 42 (cm)  Internal Catheter Total Length: 44 (cm)  Vein Selection for PICC:right cephalic  Central Line Bundle followed yes  Complication Related to Insertion: none  Both the insertion guidewire and sherlock guidewire were removed intact all ports have positive blood return and were flush well with normal saline. The placement was verified by sapiens ecg. The ECG results state the tip overlies the lower superior vena cava.          Line is okay to use: yes    Bradley Ng, RN

## 2018-06-18 NOTE — ED PROVIDER NOTES
HPI Comments: Pt presents pov via moms car with markedly decreased mental status, arrival fsbg in 50's, low BP, low sats. Awakens to mumble a few words. Difficult iv ACECSS, DOENS'T FLINCH MUCH FOR RIGHT TIBIAL I/O. Hx per mom and  :  Has been having abdominal pain since yesterday  Has had 12ish syncopal spells, sometimes passing out completely, sometimes more like nodding off for a few seconds.  relates increased voluntary movements, especially to her arms,  Especially when asleep, and when \"passed out\". Per husbands description o nphone, sound more myoclonic than epileptic    Main PMH is chronic pain / neuropathy, and diabetes on long term insulin,    Reportedly ate breakfast this am.    Called mom around 13:00 saying she WAS SCARED, and FELT BAD  Mom got there around 16:00 and pt ate a full meal, then took her insulin. Then became less responsive again    D-50 given after I/o line , fsbg up to 110's, pt talking now. unusure when her last pain meds were (e.r. And i.r. Morphine)    The history is provided by the spouse, a parent and the patient.         Past Medical History:   Diagnosis Date    Arthritis     Chronic pain     back    Diabetes (HCC)     Type 2, av fastin glucose 214, can tell when glucose lower than50    Endocrine disease     Gastrointestinal disorder     reflux    GERD (gastroesophageal reflux disease)     Infectious disease     MRSA    Other ill-defined conditions(099.89)     ruptured disc, thyroid, platelet disorder    Psychiatric disorder     Thyroid disease     hypothyroidism       Past Surgical History:   Procedure Laterality Date    HX GYN      ovarian cyst drained    HX ORTHOPAEDIC  2007, 2015    back x 2         Family History:   Problem Relation Age of Onset    Diabetes Mother     Cancer Father     Hypertension Father     Diabetes Father        Social History     Social History    Marital status:      Spouse name: N/A    Number of children: N/A    Years of education: N/A     Occupational History    Not on file. Social History Main Topics    Smoking status: Never Smoker    Smokeless tobacco: Never Used    Alcohol use No    Drug use: No    Sexual activity: No     Other Topics Concern    Not on file     Social History Narrative         ALLERGIES: Codeine    Review of Systems   Unable to perform ROS: Mental status change   Gastrointestinal: Positive for abdominal pain. Musculoskeletal: Positive for arthralgias and back pain. Neurological: Positive for tremors. Psychiatric/Behavioral: Positive for decreased concentration. Vitals:    06/17/18 2011 06/17/18 2020 06/17/18 2022 06/17/18 2024   BP:  (!) 78/35 (!) 68/47 (!) 81/46   Pulse: (!) 119 (!) 115 (!) 116 (!) 111   Resp:       Temp:       SpO2: (!) 79% 94% 91% 97%   Weight:       Height:                Physical Exam   Constitutional: She appears well-developed and well-nourished. She appears distressed. Morbidly obese  Hypoxic  Hypotensive  doesnt flinch during right tibial i/o line placement   HENT:   Head: Normocephalic and atraumatic. Eyes: Conjunctivae and EOM are normal. Pupils are equal, round, and reactive to light. Right eye exhibits no discharge. Left eye exhibits no discharge. No scleral icterus. Neck: Normal range of motion. Neck supple. Cardiovascular: Regular rhythm and normal heart sounds. Tachycardia present. Exam reveals no gallop. No murmur heard. Pulmonary/Chest: Effort normal and breath sounds normal. No respiratory distress. She has no wheezes. She has no rales. Abdominal: Soft. There is no tenderness. There is no guarding. Musculoskeletal: Normal range of motion. She exhibits no edema. Neurological:   Obtunded  Improving  Talking after dextrose  Occasional right arm myoclonus   Skin: Skin is warm and dry. She is not diaphoretic. Neither cyanosis nor diaphoresis   Nursing note and vitals reviewed.        MDM  Number of Diagnoses or Management Options  Acute respiratory failure with hypercapnia ([address]):   DANIEL (acute kidney injury) ([facility] [address]): Hypoglycemia:   Septic shock [facility]):   Diagnosis management comments: Medical decision making note:  Ams/hypoglycemia/hypotension  abd pain per   Scan head anc abd  Lactate and wbc up  Broad spectrum abx after blood cultures from right femoral  Syncope repeatedly for 2 days  This concludes the \"medical decision making note\" part of this emergency department visit note. Risk of Complications, Morbidity, and/or Mortality  Presenting problems: high  Diagnostic procedures: high  Management options: high  General comments: Based on height on calculating an ideal body weight 150 pounds = 68.2 kg  This makes a 30 cc/kg ideal body weight crystalloid challenge of 2046 cc.  2250 cc and ordered to complete her septic shock saline bolus. Oxygenation is better, sugar is better, kidney function is off, for which catheters placed but patient currently and uric. Chest x-ray unrevealing. Count and lactic acid elevated. Vancomycin and Zosyn have been ordered. She will be admitted to the hospitalist service and perhaps transfer downtown for closer management by nephrology. 80 minutes and then spent the care, evaluation, management, and family counseling of this critically ill patient in septic shock with multiorgan dysfunction.     Critical Care  Total time providing critical care:  minutes    Patient Progress  Patient progress: improved        ED Course       Procedures

## 2018-06-18 NOTE — PROGRESS NOTES
Primary Nurse Larissa Pavon and Roxana Alexander RN performed a dual skin assessment on this patient. Patient with no skin breakdown noted. Glasses with family. Skin warm and dry.

## 2018-06-18 NOTE — PROGRESS NOTES
Patient tearful this afternoon, stated \"I just want to sleep so I don't have to be awake and feel the pain. \"  Patient has heating pad on shoulder, repositioned with pillows. Asked  to bring in home voltaren gel to help with pain control.

## 2018-06-18 NOTE — ED NOTES
Pt given 2 mg of narcan, pt responded well, became more alert, vitals slightly improved, patient now able to answer questions and have conversation with ED staff.

## 2018-06-19 LAB
ALBUMIN SERPL-MCNC: 2.4 G/DL (ref 3.5–5)
ALBUMIN/GLOB SERPL: 0.7 {RATIO} (ref 1.2–3.5)
ALP SERPL-CCNC: 82 U/L (ref 50–136)
ALT SERPL-CCNC: 51 U/L (ref 12–65)
ANION GAP SERPL CALC-SCNC: 6 MMOL/L (ref 7–16)
ARTERIAL PATENCY WRIST A: POSITIVE
AST SERPL-CCNC: 109 U/L (ref 15–37)
BASE DEFICIT BLDA-SCNC: 1 MMOL/L (ref 0–2)
BASOPHILS # BLD: 0 K/UL (ref 0–0.2)
BASOPHILS NFR BLD: 0 % (ref 0–2)
BDY SITE: ABNORMAL
BILIRUB SERPL-MCNC: 0.3 MG/DL (ref 0.2–1.1)
BUN SERPL-MCNC: 19 MG/DL (ref 6–23)
CALCIUM SERPL-MCNC: 7.9 MG/DL (ref 8.3–10.4)
CHLORIDE SERPL-SCNC: 106 MMOL/L (ref 98–107)
CO2 SERPL-SCNC: 28 MMOL/L (ref 21–32)
COHGB MFR BLD: 0.2 % (ref 0.5–1.5)
CREAT SERPL-MCNC: 0.85 MG/DL (ref 0.6–1)
DIFFERENTIAL METHOD BLD: ABNORMAL
DO-HGB BLD-MCNC: 3 % (ref 0–5)
EOSINOPHIL # BLD: 0.1 K/UL (ref 0–0.8)
EOSINOPHIL NFR BLD: 2 % (ref 0.5–7.8)
ERYTHROCYTE [DISTWIDTH] IN BLOOD BY AUTOMATED COUNT: 16.2 % (ref 11.9–14.6)
GLOBULIN SER CALC-MCNC: 3.4 G/DL (ref 2.3–3.5)
GLUCOSE BLD STRIP.AUTO-MCNC: 135 MG/DL (ref 65–100)
GLUCOSE BLD STRIP.AUTO-MCNC: 223 MG/DL (ref 65–100)
GLUCOSE BLD STRIP.AUTO-MCNC: 231 MG/DL (ref 65–100)
GLUCOSE BLD STRIP.AUTO-MCNC: 265 MG/DL (ref 65–100)
GLUCOSE SERPL-MCNC: 158 MG/DL (ref 65–100)
HCO3 BLDA-SCNC: 23 MMOL/L (ref 22–26)
HCT VFR BLD AUTO: 29.1 % (ref 35.8–46.3)
HGB BLD-MCNC: 9.2 G/DL (ref 11.7–15.4)
HGB BLDMV-MCNC: 10.3 GM/DL (ref 11.7–15)
IMM GRANULOCYTES # BLD: 0 K/UL (ref 0–0.5)
IMM GRANULOCYTES NFR BLD AUTO: 0 % (ref 0–5)
LYMPHOCYTES # BLD: 2 K/UL (ref 0.5–4.6)
LYMPHOCYTES NFR BLD: 25 % (ref 13–44)
MCH RBC QN AUTO: 25.6 PG (ref 26.1–32.9)
MCHC RBC AUTO-ENTMCNC: 31.6 G/DL (ref 31.4–35)
MCV RBC AUTO: 81.1 FL (ref 79.6–97.8)
METHGB MFR BLD: 0.3 % (ref 0–1.5)
MONOCYTES # BLD: 1 K/UL (ref 0.1–1.3)
MONOCYTES NFR BLD: 13 % (ref 4–12)
NEUTS SEG # BLD: 4.9 K/UL (ref 1.7–8.2)
NEUTS SEG NFR BLD: 60 % (ref 43–78)
OXYHGB MFR BLDA: 96.6 % (ref 94–97)
PCO2 BLDA: 38 MMHG (ref 35–45)
PEEP RESPIRATORY: 10 CM[H2O]
PH BLDA: 7.41 [PH] (ref 7.35–7.45)
PLATELET # BLD AUTO: 172 K/UL (ref 150–450)
PMV BLD AUTO: 10.8 FL (ref 10.8–14.1)
PO2 BLDA: 103 MMHG (ref 80–105)
POTASSIUM SERPL-SCNC: 3.7 MMOL/L (ref 3.5–5.1)
PROT SERPL-MCNC: 5.8 G/DL (ref 6.3–8.2)
RBC # BLD AUTO: 3.59 M/UL (ref 4.05–5.25)
RESP RATE: 25
SAO2 % BLD: 97 % (ref 92–98.5)
SODIUM SERPL-SCNC: 140 MMOL/L (ref 136–145)
TSH SERPL DL<=0.005 MIU/L-ACNC: 0.25 UIU/ML (ref 0.36–3.74)
VANCOMYCIN TROUGH SERPL-MCNC: 8 UG/ML (ref 5–20)
VENTILATION MODE VENT: ABNORMAL
VT SETTING VENT: 632 ML
WBC # BLD AUTO: 8.1 K/UL (ref 4.3–11.1)

## 2018-06-19 PROCEDURE — 36600 WITHDRAWAL OF ARTERIAL BLOOD: CPT

## 2018-06-19 PROCEDURE — 94660 CPAP INITIATION&MGMT: CPT

## 2018-06-19 PROCEDURE — 82962 GLUCOSE BLOOD TEST: CPT

## 2018-06-19 PROCEDURE — 94760 N-INVAS EAR/PLS OXIMETRY 1: CPT

## 2018-06-19 PROCEDURE — 77030020263 HC SOL INJ SOD CL0.9% LFCR 1000ML

## 2018-06-19 PROCEDURE — 80053 COMPREHEN METABOLIC PANEL: CPT | Performed by: FAMILY MEDICINE

## 2018-06-19 PROCEDURE — 74011250637 HC RX REV CODE- 250/637: Performed by: FAMILY MEDICINE

## 2018-06-19 PROCEDURE — 74011000258 HC RX REV CODE- 258: Performed by: FAMILY MEDICINE

## 2018-06-19 PROCEDURE — 74011636637 HC RX REV CODE- 636/637: Performed by: FAMILY MEDICINE

## 2018-06-19 PROCEDURE — 74011250636 HC RX REV CODE- 250/636: Performed by: FAMILY MEDICINE

## 2018-06-19 PROCEDURE — 74011250637 HC RX REV CODE- 250/637: Performed by: HOSPITALIST

## 2018-06-19 PROCEDURE — 65610000001 HC ROOM ICU GENERAL

## 2018-06-19 PROCEDURE — 84443 ASSAY THYROID STIM HORMONE: CPT | Performed by: INTERNAL MEDICINE

## 2018-06-19 PROCEDURE — 77010033678 HC OXYGEN DAILY

## 2018-06-19 PROCEDURE — 82803 BLOOD GASES ANY COMBINATION: CPT

## 2018-06-19 PROCEDURE — 80202 ASSAY OF VANCOMYCIN: CPT | Performed by: FAMILY MEDICINE

## 2018-06-19 PROCEDURE — 99233 SBSQ HOSP IP/OBS HIGH 50: CPT | Performed by: INTERNAL MEDICINE

## 2018-06-19 PROCEDURE — 85025 COMPLETE CBC W/AUTO DIFF WBC: CPT | Performed by: FAMILY MEDICINE

## 2018-06-19 RX ORDER — VANCOMYCIN 2 GRAM/500 ML IN 0.9 % SODIUM CHLORIDE INTRAVENOUS
2000 EVERY 8 HOURS
Status: DISCONTINUED | OUTPATIENT
Start: 2018-06-19 | End: 2018-06-21 | Stop reason: HOSPADM

## 2018-06-19 RX ORDER — MORPHINE SULFATE 15 MG/1
15 TABLET ORAL 2 TIMES DAILY
Status: DISCONTINUED | OUTPATIENT
Start: 2018-06-19 | End: 2018-06-21 | Stop reason: HOSPADM

## 2018-06-19 RX ADMIN — ACETAMINOPHEN 650 MG: 325 TABLET ORAL at 21:08

## 2018-06-19 RX ADMIN — HEPARIN SODIUM 5000 UNITS: 5000 INJECTION, SOLUTION INTRAVENOUS; SUBCUTANEOUS at 17:00

## 2018-06-19 RX ADMIN — SODIUM CHLORIDE, PRESERVATIVE FREE 300 UNITS: 5 INJECTION INTRAVENOUS at 14:00

## 2018-06-19 RX ADMIN — ACETAMINOPHEN 650 MG: 325 TABLET ORAL at 09:10

## 2018-06-19 RX ADMIN — PREGABALIN 100 MG: 50 CAPSULE ORAL at 18:30

## 2018-06-19 RX ADMIN — FAMOTIDINE 20 MG: 20 TABLET ORAL at 09:09

## 2018-06-19 RX ADMIN — PIPERACILLIN SODIUM AND TAZOBACTAM SODIUM 3.38 G: 3; .375 INJECTION, POWDER, LYOPHILIZED, FOR SOLUTION INTRAVENOUS at 04:42

## 2018-06-19 RX ADMIN — VANCOMYCIN HYDROCHLORIDE 2000 MG: 10 INJECTION, POWDER, LYOPHILIZED, FOR SOLUTION INTRAVENOUS at 10:29

## 2018-06-19 RX ADMIN — SODIUM CHLORIDE 125 ML/HR: 900 INJECTION, SOLUTION INTRAVENOUS at 04:42

## 2018-06-19 RX ADMIN — MORPHINE SULFATE 15 MG: 15 TABLET ORAL at 23:44

## 2018-06-19 RX ADMIN — PIPERACILLIN SODIUM AND TAZOBACTAM SODIUM 3.38 G: 3; .375 INJECTION, POWDER, LYOPHILIZED, FOR SOLUTION INTRAVENOUS at 13:37

## 2018-06-19 RX ADMIN — Medication 20 ML: at 05:16

## 2018-06-19 RX ADMIN — INSULIN HUMAN 6 UNITS: 100 INJECTION, SOLUTION PARENTERAL at 21:21

## 2018-06-19 RX ADMIN — PIPERACILLIN SODIUM AND TAZOBACTAM SODIUM 3.38 G: 3; .375 INJECTION, POWDER, LYOPHILIZED, FOR SOLUTION INTRAVENOUS at 21:09

## 2018-06-19 RX ADMIN — SODIUM CHLORIDE, PRESERVATIVE FREE 300 UNITS: 5 INJECTION INTRAVENOUS at 21:12

## 2018-06-19 RX ADMIN — HEPARIN SODIUM 5000 UNITS: 5000 INJECTION, SOLUTION INTRAVENOUS; SUBCUTANEOUS at 09:09

## 2018-06-19 RX ADMIN — Medication 5 ML: at 05:16

## 2018-06-19 RX ADMIN — INSULIN HUMAN 4 UNITS: 100 INJECTION, SOLUTION PARENTERAL at 13:36

## 2018-06-19 RX ADMIN — Medication 20 ML: at 21:12

## 2018-06-19 RX ADMIN — DULOXETINE HYDROCHLORIDE 30 MG: 30 CAPSULE, DELAYED RELEASE ORAL at 09:09

## 2018-06-19 RX ADMIN — SODIUM CHLORIDE, PRESERVATIVE FREE 600 UNITS: 5 INJECTION INTRAVENOUS at 05:16

## 2018-06-19 RX ADMIN — VANCOMYCIN HYDROCHLORIDE 2000 MG: 10 INJECTION, POWDER, LYOPHILIZED, FOR SOLUTION INTRAVENOUS at 18:57

## 2018-06-19 RX ADMIN — LEVOTHYROXINE SODIUM 100 MCG: 100 TABLET ORAL at 05:15

## 2018-06-19 RX ADMIN — INSULIN HUMAN 4 UNITS: 100 INJECTION, SOLUTION PARENTERAL at 17:13

## 2018-06-19 RX ADMIN — PREGABALIN 100 MG: 50 CAPSULE ORAL at 09:10

## 2018-06-19 RX ADMIN — FAMOTIDINE 20 MG: 20 TABLET ORAL at 21:08

## 2018-06-19 RX ADMIN — Medication 20 ML: at 16:48

## 2018-06-19 NOTE — PROGRESS NOTES
Resting in bed. Talking on the telephone. Voided x 2 since corrigan was discontinued. No headache now.

## 2018-06-19 NOTE — PROGRESS NOTES
Bedside report received from Evan, [facility]. Pt drowsy and nods off. Pt oriented x4. Breath sounds diminished. S1S2. Abdomen soft, intact, one bruise to lower abd. Pulses palpable in all extremities. Skin warm, dry, intact. Pt on 4L NC. Family at bedside. Will continue to monitor. Full assessment as charted.

## 2018-06-19 NOTE — PROGRESS NOTES
Interdisciplinary team rounds were held 6/19/2018 with the following team members:Care Management, Nursing, Physical Therapy, Physician and Clinical Coordinator and the patient. Plan of care discussed. See clinical pathway and/or care plan for interventions and desired outcomes.

## 2018-06-19 NOTE — PROGRESS NOTES
Vancomycin Consult (Day 2)    MD ordering: Sherry MORFIN following? no  Indication: sepsis  DOT:  ?  days  Goal level(s): 15 - 20    Ht Readings from Last 1 Encounters:   18 5' 5\" (1.651 m)      Wt Readings from Last 1 Encounters:   18 (!) 194.4 kg (428 lb 8 oz)       Allergies as of 2018 - Review Complete 2018   Allergen Reaction Noted    Codeine Other (comments) 2008     Current Antimicrobial Therapy (168h ago through future)      Ordered     Start Stop      18 0702  vancomycin (VANCOCIN) 2000 mg in  ml infusion  2,000 mg,   IntraVENous,   EVERY 12 HOURS      18 0800 --    18 2254  piperacillin-tazobactam (ZOSYN) 3.375 g in 0.9% sodium chloride (MBP/ADV) 100 mL  3.375 g,   IntraVENous,   EVERY 8 HOURS      18 0500 --            All Micro Results       Procedure Component Value Units Date/Time      CULTURE, BLOOD [[id number]] Collected:  18    Order Status:  Completed Specimen:  Blood from Blood Updated:  18    CULTURE, BLOOD [[id number]] Collected:  18    Order Status:  Completed Specimen:  Blood from Blood Updated:  18            Temp (24hrs), Av.5 °F (36.9 °C), Min:97 °F (36.1 °C), Max:98.9 °F (37.2 °C)    UOP: mL/kg/hr  Dosing weight: 194 kg (actual weight)  41 y.o. Date:  Dose/Freq Admin Times Level/Time:    1 gram   0145     2 grams IV every 12 hours  1029 (19)  Trough ordered for 7 am    (03)       Recent Labs      18   03118   BUN  33*  38*   --   37*   CREA  2.46*  3.26*   --   3.63*   WBC  15.2*   --    --   15.9*   PCT   --   0.3   --    --    LAC  0.7  1.4   --    --    LACPOC   --    --   2.6*   --      Estimated Creatinine Clearance: 55.4 mL/min (based on Cr of 2.46). A/P:  Based on the trough, Vancomycin increased to 2 grams IV every 8 hours.       Bogdan CampbellD, BCPS

## 2018-06-19 NOTE — PROGRESS NOTES
Critical Care Daily Progress Note: 6/19/2018    Dayanna Bains   Admission Date: 6/17/2018         The patient's chart is reviewed and the patient is discussed with the staff. Dayanna Bains is a 46yoF who is obese (431lbs) and was admitted on 6/17 with confusion, fevers, renal failure, hypoglycemia, hypotension, metabolic acidosis with poor compensation, suspected JEFF with progressive sleepiness for weeks leading up to hospitalization       Subjective:   Creatinine is now down to 0.85 and patient spiked fever to 101.9 yesterday.   Has 1/2 blood cultures with GPCs  Leukocytosis trending down and FSBS with hyperglycemia  ABG near normal.  C/o nausea    Current Facility-Administered Medications   Medication Dose Route Frequency    vancomycin (VANCOCIN) 2000 mg in  ml infusion  2,000 mg IntraVENous Q12H    VANCOMYCIN INFORMATION NOTE   Other ONCE    sodium chloride (NS) flush 20 mL  20 mL InterCATHeter Q8H    heparin (porcine) pf 600 Units  600 Units InterCATHeter Q8H    sodium chloride (NS) flush 20 mL  20 mL InterCATHeter PRN    heparin (porcine) pf 600 Units  600 Units InterCATHeter PRN    DULoxetine (CYMBALTA) capsule 30 mg  30 mg Oral DAILY    levothyroxine (SYNTHROID) tablet 100 mcg  100 mcg Oral ACB    pregabalin (LYRICA) capsule 100 mg  100 mg Oral BID    sodium chloride (NS) flush 5 mL  5 mL InterCATHeter Q8H    sodium chloride (NS) flush 5-10 mL  5-10 mL InterCATHeter PRN    insulin regular (NOVOLIN R, HUMULIN R) injection   SubCUTAneous AC&HS    dextrose 40% (GLUTOSE) oral gel 1 Tube  15 g Oral PRN    glucagon (GLUCAGEN) injection 1 mg  1 mg IntraMUSCular PRN    dextrose (D50W) injection syrg 12.5-25 g  25-50 mL IntraVENous PRN    0.9% sodium chloride infusion  125 mL/hr IntraVENous CONTINUOUS    acetaminophen (TYLENOL) tablet 650 mg  650 mg Oral Q4H PRN    famotidine (PEPCID) tablet 20 mg  20 mg Oral Q12H    heparin (porcine) injection 5,000 Units  5,000 Units SubCUTAneous Q8H  piperacillin-tazobactam (ZOSYN) 3.375 g in 0.9% sodium chloride (MBP/ADV) 100 mL  3.375 g IntraVENous Q8H       Review of Systems  Constitutional:  + for fever, No chills, sweats  Cardiovascular:  negative for chest pain, palpitations, syncope, edema  Gastrointestinal:  negative for dysphagia, reflux, vomiting, diarrhea, abdominal pain, or melena  Neurologic:  negative for focal weakness, numbness, headache      Objective:     Vitals:    06/19/18 0458 06/19/18 0528 06/19/18 0558 06/19/18 0628   BP: 116/49 135/64 144/70 99/55   Pulse: 95 97 (!) 102 99   Resp: 17 14 14 12   Temp:       SpO2: 100% 99% 99% 100%   Weight:       Height:           Intake and Output:   06/17 1901 - 06/19 0700  In: 7295.9 [P.O.:1500; I.V.:5795.9]  Out: 8685 [Urine:8685]       Physical Exam:          Constitutional:  the patient is well developed and in no acute distress  EENMT:  Sclera clear, pupils equal, oral mucosa moist  Respiratory: CTAB  Cardiovascular:  RRR without M,G,R  Gastrointestinal: soft and non-tender; with positive bowel sounds. Musculoskeletal: warm without cyanosis. There is no lower leg edema.   Skin:  no jaundice or rashes, no wounds   Neurologic: no gross neuro deficits     Psychiatric:  alert and oriented x 3    LINES:  PICC right 6/18    DRIPS: NS @ 125ml/hr    CXR: no infiltrate      LAB  Recent Labs      06/18/18   2136  06/18/18   1711  06/18/18   1108  06/18/18   0743  06/18/18   0154   GLUCPOC  252*  199*  249*  235*  132*      Recent Labs      06/19/18   0324  06/18/18   0310  06/17/18 2020   WBC  8.1  15.2*  15.9*   HGB  9.2*  11.1*  11.5*   HCT  29.1*  34.0*  37.2   PLT  172  261  288     Recent Labs      06/19/18   0324  06/18/18   0310  06/17/18   2302  06/17/18 2020   NA  140  134*  132*  131*   K  3.7  4.8  4.2  3.8   CL  106  98  99  96*   CO2  28  26  25  28   GLU  158*  177*  70  54*   BUN  19  33*  38*  37*   CREA  0.85  2.46*  3.26*  3.63*   MG   --   2.3   --   2.3   PHOS   --   5.3*   -- 6.3*   CA  7.9*  8.3  8.1*  9.4   ALB  2.4*  3.1*   --   3.5   TBILI  0.3  0.4   --   0.5   ALT  51  40   --   43   SGOT  109*  37   --   25     Recent Labs      06/19/18   0600  06/18/18   0735  06/18/18   0412   PH  7.41  7.30*  7.27*   PCO2  38  50*  53*   PO2  103  119*  82   HCO3  23  24  24     Recent Labs      06/18/18   0310  06/17/18   2302   LAC  0.7  1.4       Assessment:  (Medical Decision Making)     Hospital Problems  Never Reviewed          Codes Class Noted POA    JEFF (obstructive sleep apnea) likely ICD-10-CM: G47.33  ICD-9-CM: 327.23  6/18/2018 Unknown    Planned for CPAP titration as outpatient    ARF (acute renal failure) ([facility] ICD-10-CM: N17.9  ICD-9-CM: 584.9  6/17/2018 Yes    Improved. Stop IVF. Hypoglycemia ICD-10-CM: E16.2  ICD-9-CM: 251.2  6/17/2018 Yes    Now hyperglycemic    * (Principal)Unresponsive ICD-10-CM: R41.89  ICD-9-CM: 780.09  6/17/2018 Yes        Respiratory acidosis ICD-10-CM: E87.2  ICD-9-CM: 276.2  6/17/2018 Yes    Acute respiratory acidosis, no clear chronic component to acidosis    Morbid obesity due to excess calories (HCC) (Chronic) ICD-10-CM: E66.01  ICD-9-CM: 278.01  6/17/2018 Yes        Sepsis ([facility] ICD-10-CM: A41.9  ICD-9-CM: 038.9, 995.91  4/1/2016 Yes        Uncontrolled type 2 diabetes mellitus, without long-term current use of insulin (HCC) (Chronic) ICD-10-CM: E11.65  ICD-9-CM: 250.02  4/1/2016 Yes            Suspect infectious illness and renal failure in setting of untreated JEFF on pain medications are to blame for AMS. Would also check TSH. Source of infection not clear to me. Plan:  (Medical Decision Making)     --Will work to expedite sleep study and CPAP titration as outpatient. Continue BiPAP in hospital.  --Begin PT  --Stop IVF   --no longer in need of ICU care. Can transfer to floor with nightly BiPAP  --f/u cultures and narrow abx as appropriate.     More than 50% of the time documented was spent in face-to-face contact with the patient and in the care of the patient on the floor/unit where the patient is located.     Aleena Brown MD

## 2018-06-19 NOTE — PROGRESS NOTES
Placed patient on BIPAP QHS per orders of Dr. Shey Griffin. Mask fit is good and patient is tolerating well. No distress.  ABG pending in am.

## 2018-06-19 NOTE — PROGRESS NOTES
Saw pt in interdisciplinarily rounds, plan of care and discharge date/ location discussed. Per the hospitalitis plans are to review pt's condition with Pulm and consider out-patient sleep study which will aid in determining if pt is in eed of a home CPAP or Trilogy machine. Pt may d/c home with  on Wednesday.

## 2018-06-19 NOTE — PROGRESS NOTES
Hospitalist Progress Note     Admit Date:  2018  7:52 PM   Name:  Gui Bains   Age:  41 y.o.  :  1980   MRN:  [id number]   PCP:  Demond Montana MD  Treatment Team: Attending Provider: Rochelle Couch MD; Consulting Provider: Kristal Figuerao MD; Consulting Provider: Silvestre Ortiz MD; Utilization Review: Rosio Quesada RN    Subjective:   Patient is a 41 y.o. female who presents to the ER due to being essentially unresponsive at home. In ER, she was noted to be minimally responsive, hypoglycemic, hypotensive, hypoxic. She was hard to get an IV and a IO had to be placed. Family reports recent abdominal pain a few days ago; unknown if n/v.  Spouse noted that she has been \"nodding off\"/\"passing out\" a lot lately. ABG showed her to have respiratory acidosis with hypoxia and hypercapnia. Improved mentation after narcan. 18: Pt on bipap-  at bedside-c/o chronic shoulder pain- able to converse appropriately. 18:  Patient is resting comfortably with  at bedside. She reports feeling better. Reports HA and asks what medication would be appropriate for her as she understands that she cannot take her home meds for fear of further sedation.       Objective:     Patient Vitals for the past 24 hrs:   Temp Pulse Resp BP SpO2   18 0830 - - - - 98 %   18 0705 99.7 °F (37.6 °C) 99 15 119/62 100 %   18 0628 - 99 12 99/55 100 %   18 0558 - (!) 102 14 144/70 99 %   18 0528 - 97 14 135/64 99 %   18 0458 - 95 17 116/49 100 %   18 0428 - 95 19 114/51 100 %   18 0402 - - - - 100 %   18 0358 - 91 11 131/61 99 %   18 0328 99.1 °F (37.3 °C) 97 20 120/59 100 %   18 0258 - 100 24 108/50 99 %   18 0230 - 96 11 127/60 98 %   18 0158 - 95 17 113/58 99 %   18 0128 - 95 17 117/54 96 %   18 0101 - 96 10 101/48 97 %   18 0058 - 92 15 95/41 99 %   18 0028 - 91 11 112/61 100 %   18 2358 - 92 18 93/65 100 %   06/18/18 2328 98.7 °F (37.1 °C) 93 18 103/48 100 %   06/18/18 2300 - - - - 98 %   06/18/18 2258 - 92 27 114/47 99 %   06/18/18 2241 - 91 19 96/47 99 %   06/18/18 2228 - 96 18 (!) 78/42 99 %   06/18/18 2203 - 94 (!) 33 (!) 93/39 98 %   06/18/18 2101 - 97 15 138/77 94 %   06/18/18 2001 - 98 14 139/70 96 %   06/18/18 1926 99.6 °F (37.6 °C) (!) 107 (!) 43 114/65 95 %   06/18/18 1801 - (!) 113 - 119/54 94 %   06/18/18 1737 99.5 °F (37.5 °C) - - - -   06/18/18 1701 - (!) 116 23 135/59 91 %   06/18/18 1619 (!) 101.9 °F (38.8 °C) (!) 110 20 127/52 96 %   06/18/18 1517 - (!) 123 14 105/41 98 %   06/18/18 1511 99.8 °F (37.7 °C) - - - -   06/18/18 1436 - (!) 124 - 120/65 95 %   06/18/18 1406 - (!) 124 11 119/83 95 %   06/18/18 1336 - (!) 122 - 133/58 96 %   06/18/18 1335 - - 22 - -   06/18/18 1235 99.6 °F (37.6 °C) (!) 117 20 125/57 97 %   06/18/18 1131 - (!) 114 18 99/74 100 %   06/18/18 1100 - (!) 110 23 (!) 86/55 100 %   06/18/18 1030 - (!) 109 23 93/67 100 %   06/18/18 1019 - - - - 98 %   06/18/18 1000 - (!) 114 23 90/45 99 %     Oxygen Therapy  O2 Sat (%): 98 % (06/19/18 0830)  Pulse via Oximetry: 94 beats per minute (06/19/18 0830)  O2 Device: Nasal cannula (06/19/18 0830)  O2 Flow Rate (L/min): 3 l/min (06/19/18 0830)  O2 Temperature: 89.6 °F (32 °C) (06/17/18 2056)  FIO2 (%): 35 % (06/19/18 0402)    Intake/Output Summary (Last 24 hours) at 06/19/18 0937  Last data filed at 06/19/18 0516   Gross per 24 hour   Intake          5797.92 ml   Output             5675 ml   Net           122.92 ml         General:    Well nourished. Alert. On bipap- not in resp distress  heent- normal  CV:   RRR. No murmur, rub, or gallop. Lungs:   Clear to auscultation bilaterally. No wheezing, rhonchi, or rales. Abdomen:   Soft, nontender, nondistended. Morbid obesity  Cns- moves all ext, no focal neurological deficits  Extremities: Warm and dry. No cyanosis or edema.  Intraosseous access rt leg  Skin:     No rashes or jaundice. Data Review:  I have reviewed all labs, meds, telemetry events, and studies from the last 24 hours. Recent Results (from the past 24 hour(s))   GLUCOSE, POC    Collection Time: 06/18/18 11:08 AM   Result Value Ref Range    Glucose (POC) 249 (H) 65 - 100 mg/dL   GLUCOSE, POC    Collection Time: 06/18/18  5:11 PM   Result Value Ref Range    Glucose (POC) 199 (H) 65 - 100 mg/dL   GLUCOSE, POC    Collection Time: 06/18/18  9:36 PM   Result Value Ref Range    Glucose (POC) 252 (H) 65 - 991 mg/dL   METABOLIC PANEL, COMPREHENSIVE    Collection Time: 06/19/18  3:24 AM   Result Value Ref Range    Sodium 140 136 - 145 mmol/L    Potassium 3.7 3.5 - 5.1 mmol/L    Chloride 106 98 - 107 mmol/L    CO2 28 21 - 32 mmol/L    Anion gap 6 (L) 7 - 16 mmol/L    Glucose 158 (H) 65 - 100 mg/dL    BUN 19 6 - 23 MG/DL    Creatinine 0.85 0.6 - 1.0 MG/DL    GFR est AA >60 >60 ml/min/1.73m2    GFR est non-AA >60 >60 ml/min/1.73m2    Calcium 7.9 (L) 8.3 - 10.4 MG/DL    Bilirubin, total 0.3 0.2 - 1.1 MG/DL    ALT (SGPT) 51 12 - 65 U/L    AST (SGOT) 109 (H) 15 - 37 U/L    Alk. phosphatase 82 50 - 136 U/L    Protein, total 5.8 (L) 6.3 - 8.2 g/dL    Albumin 2.4 (L) 3.5 - 5.0 g/dL    Globulin 3.4 2.3 - 3.5 g/dL    A-G Ratio 0.7 (L) 1.2 - 3.5     CBC WITH AUTOMATED DIFF    Collection Time: 06/19/18  3:24 AM   Result Value Ref Range    WBC 8.1 4.3 - 11.1 K/uL    RBC 3.59 (L) 4.05 - 5.25 M/uL    HGB 9.2 (L) 11.7 - 15.4 g/dL    HCT 29.1 (L) 35.8 - 46.3 %    MCV 81.1 79.6 - 97.8 FL    MCH 25.6 (L) 26.1 - 32.9 PG    MCHC 31.6 31.4 - 35.0 g/dL    RDW 16.2 (H) 11.9 - 14.6 %    PLATELET 583 858 - 456 K/uL    MPV 10.8 10.8 - 14.1 FL    DF AUTOMATED      NEUTROPHILS 60 43 - 78 %    LYMPHOCYTES 25 13 - 44 %    MONOCYTES 13 (H) 4.0 - 12.0 %    EOSINOPHILS 2 0.5 - 7.8 %    BASOPHILS 0 0.0 - 2.0 %    IMMATURE GRANULOCYTES 0 0.0 - 5.0 %    ABS. NEUTROPHILS 4.9 1.7 - 8.2 K/UL    ABS. LYMPHOCYTES 2.0 0.5 - 4.6 K/UL    ABS.  MONOCYTES 1.0 0.1 - 1.3 K/UL    ABS. EOSINOPHILS 0.1 0.0 - 0.8 K/UL    ABS. BASOPHILS 0.0 0.0 - 0.2 K/UL    ABS. IMM. GRANS. 0.0 0.0 - 0.5 K/UL   BLOOD GAS, ARTERIAL    Collection Time: 06/19/18  6:00 AM   Result Value Ref Range    pH 7.41 7.35 - 7.45      PCO2 38 35 - 45 mmHg    PO2 103 80 - 105 mmHg    BICARBONATE 23 22 - 26 mmol/L    BASE DEFICIT 1.0 0 - 2 mmol/L    TOTAL HEMOGLOBIN 10.3 (L) 11.7 - 15.0 GM/DL    O2 SAT 97 92 - 98.5 %    Arterial O2 Hgb 96.6 94 - 97 %    CARBOXYHEMOGLOBIN 0.2 (L) 0.5 - 1.5 %    METHEMOGLOBIN 0.3 0.0 - 1.5 %    DEOXYHEMOGLOBIN 3 0.0 - 5.0 %    SITE RR     ALLENS TEST POSITIVE      MODE BIPAP     Tidal volume 632.0      RATE 25.0      PEEP/CPAP 10.0     GLUCOSE, POC    Collection Time: 06/19/18  8:33 AM   Result Value Ref Range    Glucose (POC) 135 (H) 65 - 100 mg/dL   Ball Angelique    Collection Time: 06/19/18  8:34 AM   Result Value Ref Range    Vancomycin,trough 8.0 5 - 20 ug/mL   TSH 3RD GENERATION    Collection Time: 06/19/18  8:34 AM   Result Value Ref Range    TSH 0.253 (L) 0.358 - 3.740 uIU/mL        All Micro Results     Procedure Component Value Units Date/Time    CULTURE, BLOOD [[id number]] Collected:  06/17/18 2112    Order Status:  Completed Specimen:  Blood from Blood Updated:  06/19/18 0049     Special Requests: --        RIGHT  CENTRAL       GRAM STAIN         AEROBIC BOTTLE POSITIVE GRAM POSITIVE COCCI              RESULTS VERIFIED, PHONED TO AND READ BACK  Negro Arteaga [facility] [id number]. SBRAZEL     Culture result:         CULTURE IN PROGRESS,FURTHER UPDATES TO FOLLOW    CULTURE, BLOOD [[id number]] Collected:  06/17/18 2114    Order Status:  Completed Specimen:  Blood from Blood Updated:  06/18/18 8571     Special Requests: --        LEFT  Antecubital       Culture result: NO GROWTH AFTER 15 HOURS             Current Meds:  Current Facility-Administered Medications   Medication Dose Route Frequency    vancomycin (VANCOCIN) 2000 mg in  ml infusion  2,000 mg IntraVENous Q12H    VANCOMYCIN INFORMATION NOTE   Other ONCE    sodium chloride (NS) flush 20 mL  20 mL InterCATHeter Q8H    heparin (porcine) pf 600 Units  600 Units InterCATHeter Q8H    sodium chloride (NS) flush 20 mL  20 mL InterCATHeter PRN    heparin (porcine) pf 600 Units  600 Units InterCATHeter PRN    DULoxetine (CYMBALTA) capsule 30 mg  30 mg Oral DAILY    levothyroxine (SYNTHROID) tablet 100 mcg  100 mcg Oral ACB    pregabalin (LYRICA) capsule 100 mg  100 mg Oral BID    sodium chloride (NS) flush 5 mL  5 mL InterCATHeter Q8H    sodium chloride (NS) flush 5-10 mL  5-10 mL InterCATHeter PRN    insulin regular (NOVOLIN R, HUMULIN R) injection   SubCUTAneous AC&HS    dextrose 40% (GLUTOSE) oral gel 1 Tube  15 g Oral PRN    glucagon (GLUCAGEN) injection 1 mg  1 mg IntraMUSCular PRN    dextrose (D50W) injection syrg 12.5-25 g  25-50 mL IntraVENous PRN    acetaminophen (TYLENOL) tablet 650 mg  650 mg Oral Q4H PRN    famotidine (PEPCID) tablet 20 mg  20 mg Oral Q12H    heparin (porcine) injection 5,000 Units  5,000 Units SubCUTAneous Q8H    piperacillin-tazobactam (ZOSYN) 3.375 g in 0.9% sodium chloride (MBP/ADV) 100 mL  3.375 g IntraVENous Q8H       Other Studies (last 24 hours):  No results found.     Assessment and Plan:     Hospital Problems as of 6/18/2018  Never Reviewed          Codes Class Noted - Resolved POA    JEFF (obstructive sleep apnea) likely ICD-10-CM: G47.33  ICD-9-CM: 327.23  6/18/2018 - Present Unknown        ARF (acute renal failure) (HCC) ICD-10-CM: N17.9  ICD-9-CM: 584.9  6/17/2018 - Present Yes        Hypoglycemia ICD-10-CM: E16.2  ICD-9-CM: 251.2  6/17/2018 - Present Yes        * (Principal)Unresponsive ICD-10-CM: R41.89  ICD-9-CM: 780.09  6/17/2018 - Present Yes        Respiratory acidosis ICD-10-CM: E87.2  ICD-9-CM: 276.2  6/17/2018 - Present Yes        Morbid obesity due to excess calories (HCC) (Chronic) ICD-10-CM: E66.01  ICD-9-CM: 278.01  6/17/2018 - Present Yes        Sepsis ([facility] ICD-10-CM: A41.9  ICD-9-CM: 038.9, 995.91  4/1/2016 - Present Yes        Uncontrolled type 2 diabetes mellitus, without long-term current use of insulin (HCC) (Chronic) ICD-10-CM: E11.65  ICD-9-CM: 250.02  4/1/2016 - Present Yes    Chronic pain          PLAN:    Unresponsiveness  - Resolved  - Possibly related to combination of hypoglycemia, pain medication, hypercapnea. Hypercapnea/acidosis  - Resolved  - On bipap at night  - Pulmonary following. DANIEL  - Resolved  - Possibly 2/2 recent change in bp medications  - Recent BUN/Cr  on 6/5/18 was normal at pcp office.   - Nephrology consulted    Headache  - Tylenol for now  - Pt understands not receiving home pain meds at this time    Sepsis/sirs  - No source of infection  - Continue broad spectrum antibiotics.     Hypotension  - Possibly 2/2 BP meds vs infectious etiology  - Resolved    DM type 2  - Was on long-acting insulin on admission  - Was hypoglycemic on admission  - Improved now    Chronic pain  - Follows with pain management  - Holding meds 2/2 obtundation on admission    Morbid obesity    DC planning/Dispo:    DVT ppx:  heparin    Signed:  Elsa Francisco MD

## 2018-06-20 LAB
ALBUMIN SERPL-MCNC: 2.4 G/DL (ref 3.5–5)
ALBUMIN/GLOB SERPL: 0.6 {RATIO} (ref 1.2–3.5)
ALP SERPL-CCNC: 87 U/L (ref 50–136)
ALT SERPL-CCNC: 65 U/L (ref 12–65)
ANION GAP SERPL CALC-SCNC: 5 MMOL/L (ref 7–16)
AST SERPL-CCNC: 94 U/L (ref 15–37)
BASOPHILS # BLD: 0 K/UL (ref 0–0.2)
BASOPHILS NFR BLD: 0 % (ref 0–2)
BILIRUB SERPL-MCNC: 0.2 MG/DL (ref 0.2–1.1)
BUN SERPL-MCNC: 9 MG/DL (ref 6–23)
CALCIUM SERPL-MCNC: 7.8 MG/DL (ref 8.3–10.4)
CHLORIDE SERPL-SCNC: 108 MMOL/L (ref 98–107)
CO2 SERPL-SCNC: 28 MMOL/L (ref 21–32)
CORTIS AM PEAK SERPL-MCNC: 12.8 UG/DL (ref 7–25)
CREAT SERPL-MCNC: 0.73 MG/DL (ref 0.6–1)
DIFFERENTIAL METHOD BLD: ABNORMAL
EOSINOPHIL # BLD: 0.1 K/UL (ref 0–0.8)
EOSINOPHIL NFR BLD: 1 % (ref 0.5–7.8)
ERYTHROCYTE [DISTWIDTH] IN BLOOD BY AUTOMATED COUNT: 15.9 % (ref 11.9–14.6)
GLOBULIN SER CALC-MCNC: 3.7 G/DL (ref 2.3–3.5)
GLUCOSE BLD STRIP.AUTO-MCNC: 128 MG/DL (ref 65–100)
GLUCOSE BLD STRIP.AUTO-MCNC: 225 MG/DL (ref 65–100)
GLUCOSE BLD STRIP.AUTO-MCNC: 227 MG/DL (ref 65–100)
GLUCOSE BLD STRIP.AUTO-MCNC: 254 MG/DL (ref 65–100)
GLUCOSE SERPL-MCNC: 182 MG/DL (ref 65–100)
HCT VFR BLD AUTO: 28.7 % (ref 35.8–46.3)
HGB BLD-MCNC: 9.1 G/DL (ref 11.7–15.4)
IMM GRANULOCYTES # BLD: 0 K/UL (ref 0–0.5)
IMM GRANULOCYTES NFR BLD AUTO: 1 % (ref 0–5)
LYMPHOCYTES # BLD: 2.3 K/UL (ref 0.5–4.6)
LYMPHOCYTES NFR BLD: 31 % (ref 13–44)
MCH RBC QN AUTO: 25.4 PG (ref 26.1–32.9)
MCHC RBC AUTO-ENTMCNC: 31.7 G/DL (ref 31.4–35)
MCV RBC AUTO: 80.2 FL (ref 79.6–97.8)
MONOCYTES # BLD: 0.7 K/UL (ref 0.1–1.3)
MONOCYTES NFR BLD: 10 % (ref 4–12)
NEUTS SEG # BLD: 4.3 K/UL (ref 1.7–8.2)
NEUTS SEG NFR BLD: 57 % (ref 43–78)
PLATELET # BLD AUTO: 168 K/UL (ref 150–450)
PMV BLD AUTO: 10.7 FL (ref 10.8–14.1)
POTASSIUM SERPL-SCNC: 3.5 MMOL/L (ref 3.5–5.1)
PROT SERPL-MCNC: 6.1 G/DL (ref 6.3–8.2)
RBC # BLD AUTO: 3.58 M/UL (ref 4.05–5.25)
SODIUM SERPL-SCNC: 141 MMOL/L (ref 136–145)
VANCOMYCIN TROUGH SERPL-MCNC: 14.9 UG/ML (ref 5–20)
WBC # BLD AUTO: 7.4 K/UL (ref 4.3–11.1)

## 2018-06-20 PROCEDURE — 74011000258 HC RX REV CODE- 258: Performed by: FAMILY MEDICINE

## 2018-06-20 PROCEDURE — 82962 GLUCOSE BLOOD TEST: CPT

## 2018-06-20 PROCEDURE — 77030027138 HC INCENT SPIROMETER -A

## 2018-06-20 PROCEDURE — 85025 COMPLETE CBC W/AUTO DIFF WBC: CPT | Performed by: FAMILY MEDICINE

## 2018-06-20 PROCEDURE — 80053 COMPREHEN METABOLIC PANEL: CPT | Performed by: FAMILY MEDICINE

## 2018-06-20 PROCEDURE — 74011250636 HC RX REV CODE- 250/636: Performed by: FAMILY MEDICINE

## 2018-06-20 PROCEDURE — 36415 COLL VENOUS BLD VENIPUNCTURE: CPT | Performed by: INTERNAL MEDICINE

## 2018-06-20 PROCEDURE — 65270000029 HC RM PRIVATE

## 2018-06-20 PROCEDURE — 77030020256 HC SOL INJ NACL 0.9%  500ML

## 2018-06-20 PROCEDURE — 74011636637 HC RX REV CODE- 636/637: Performed by: FAMILY MEDICINE

## 2018-06-20 PROCEDURE — 80202 ASSAY OF VANCOMYCIN: CPT | Performed by: INTERNAL MEDICINE

## 2018-06-20 PROCEDURE — 77030021668 HC NEB PREFIL KT VYRM -A

## 2018-06-20 PROCEDURE — 74011250637 HC RX REV CODE- 250/637: Performed by: HOSPITALIST

## 2018-06-20 PROCEDURE — 94660 CPAP INITIATION&MGMT: CPT

## 2018-06-20 PROCEDURE — 74011250637 HC RX REV CODE- 250/637: Performed by: FAMILY MEDICINE

## 2018-06-20 PROCEDURE — 99232 SBSQ HOSP IP/OBS MODERATE 35: CPT | Performed by: INTERNAL MEDICINE

## 2018-06-20 PROCEDURE — 82533 TOTAL CORTISOL: CPT | Performed by: FAMILY MEDICINE

## 2018-06-20 RX ORDER — INSULIN GLARGINE 100 [IU]/ML
20 INJECTION, SOLUTION SUBCUTANEOUS DAILY
Status: DISCONTINUED | OUTPATIENT
Start: 2018-06-20 | End: 2018-06-21 | Stop reason: HOSPADM

## 2018-06-20 RX ORDER — INSULIN GLARGINE 100 [IU]/ML
15 INJECTION, SOLUTION SUBCUTANEOUS DAILY
Status: DISCONTINUED | OUTPATIENT
Start: 2018-06-20 | End: 2018-06-20

## 2018-06-20 RX ADMIN — PREGABALIN 100 MG: 50 CAPSULE ORAL at 09:19

## 2018-06-20 RX ADMIN — SODIUM CHLORIDE, PRESERVATIVE FREE 600 UNITS: 5 INJECTION INTRAVENOUS at 13:25

## 2018-06-20 RX ADMIN — MORPHINE SULFATE 15 MG: 15 TABLET ORAL at 09:19

## 2018-06-20 RX ADMIN — VANCOMYCIN HYDROCHLORIDE 2000 MG: 10 INJECTION, POWDER, LYOPHILIZED, FOR SOLUTION INTRAVENOUS at 11:38

## 2018-06-20 RX ADMIN — Medication 20 ML: at 23:57

## 2018-06-20 RX ADMIN — INSULIN GLARGINE 20 UNITS: 100 INJECTION, SOLUTION SUBCUTANEOUS at 20:13

## 2018-06-20 RX ADMIN — INSULIN GLARGINE 15 UNITS: 100 INJECTION, SOLUTION SUBCUTANEOUS at 09:19

## 2018-06-20 RX ADMIN — LEVOTHYROXINE SODIUM 100 MCG: 100 TABLET ORAL at 05:47

## 2018-06-20 RX ADMIN — Medication 20 ML: at 13:25

## 2018-06-20 RX ADMIN — HEPARIN SODIUM 5000 UNITS: 5000 INJECTION, SOLUTION INTRAVENOUS; SUBCUTANEOUS at 09:19

## 2018-06-20 RX ADMIN — PREGABALIN 100 MG: 50 CAPSULE ORAL at 17:18

## 2018-06-20 RX ADMIN — Medication 20 ML: at 05:51

## 2018-06-20 RX ADMIN — SODIUM CHLORIDE, PRESERVATIVE FREE 300 UNITS: 5 INJECTION INTRAVENOUS at 05:50

## 2018-06-20 RX ADMIN — INSULIN HUMAN 4 UNITS: 100 INJECTION, SOLUTION PARENTERAL at 11:41

## 2018-06-20 RX ADMIN — HEPARIN SODIUM 5000 UNITS: 5000 INJECTION, SOLUTION INTRAVENOUS; SUBCUTANEOUS at 01:56

## 2018-06-20 RX ADMIN — PIPERACILLIN SODIUM AND TAZOBACTAM SODIUM 3.38 G: 3; .375 INJECTION, POWDER, LYOPHILIZED, FOR SOLUTION INTRAVENOUS at 05:47

## 2018-06-20 RX ADMIN — VANCOMYCIN HYDROCHLORIDE 2000 MG: 10 INJECTION, POWDER, LYOPHILIZED, FOR SOLUTION INTRAVENOUS at 02:00

## 2018-06-20 RX ADMIN — HEPARIN SODIUM 5000 UNITS: 5000 INJECTION, SOLUTION INTRAVENOUS; SUBCUTANEOUS at 17:17

## 2018-06-20 RX ADMIN — Medication 5 ML: at 23:57

## 2018-06-20 RX ADMIN — INSULIN HUMAN 4 UNITS: 100 INJECTION, SOLUTION PARENTERAL at 23:56

## 2018-06-20 RX ADMIN — MORPHINE SULFATE 15 MG: 15 TABLET ORAL at 17:17

## 2018-06-20 RX ADMIN — FAMOTIDINE 20 MG: 20 TABLET ORAL at 09:19

## 2018-06-20 RX ADMIN — PIPERACILLIN SODIUM AND TAZOBACTAM SODIUM 3.38 G: 3; .375 INJECTION, POWDER, LYOPHILIZED, FOR SOLUTION INTRAVENOUS at 13:25

## 2018-06-20 RX ADMIN — FAMOTIDINE 20 MG: 20 TABLET ORAL at 23:55

## 2018-06-20 RX ADMIN — INSULIN HUMAN 6 UNITS: 100 INJECTION, SOLUTION PARENTERAL at 17:17

## 2018-06-20 RX ADMIN — PIPERACILLIN SODIUM AND TAZOBACTAM SODIUM 3.38 G: 3; .375 INJECTION, POWDER, LYOPHILIZED, FOR SOLUTION INTRAVENOUS at 23:55

## 2018-06-20 RX ADMIN — DULOXETINE HYDROCHLORIDE 30 MG: 30 CAPSULE, DELAYED RELEASE ORAL at 09:19

## 2018-06-20 RX ADMIN — VANCOMYCIN HYDROCHLORIDE 2000 MG: 10 INJECTION, POWDER, LYOPHILIZED, FOR SOLUTION INTRAVENOUS at 20:13

## 2018-06-20 RX ADMIN — SODIUM CHLORIDE, PRESERVATIVE FREE 600 UNITS: 5 INJECTION INTRAVENOUS at 23:56

## 2018-06-20 NOTE — PROGRESS NOTES
TRANSFER - OUT REPORT:    Verbal report given to Tiara Ross RN on Dayanna Bains  being transferred to [facility] for routine progression of care       Report consisted of patients Situation, Background, Assessment and   Recommendations(SBAR). Information from the following report(s) SBAR, Kardex, Intake/Output, MAR, Recent Results, Med Rec Status and Cardiac Rhythm NSR was reviewed with the receiving nurse. Lines:   PICC Double Lumen 46/89/42 Right;Cephalic (Active)   Central Line Being Utilized Yes 6/20/2018  9:00 AM   Criteria for Appropriate Use Limited/no vessel suitable for conventional peripheral access 6/20/2018  9:00 AM   Site Assessment Clean;Dry 6/20/2018  9:00 AM   Phlebitis Assessment 0 6/20/2018  9:00 AM   Infiltration Assessment 0 6/20/2018  9:00 AM   Arm Circumference (cm) 45 cm 6/18/2018  1:33 PM   Date of Last Dressing Change 06/18/18 6/20/2018  9:00 AM   Dressing Status Clean, dry, & intact 6/20/2018  9:00 AM   Action Taken Dressing changed 6/18/2018  9:47 PM   External Catheter Length (cm) 2 centimeters 6/18/2018  1:33 PM   Dressing Type Disk with Chlorhexadine gluconate (CHG); Transparent 6/20/2018  9:00 AM   Hub Color/Line Status Flushed;Patent 6/20/2018  9:00 AM   Positive Blood Return (Site #1) Yes 6/20/2018  9:00 AM   Hub Color/Line Status Flushed;Patent 6/20/2018  9:00 AM   Positive Blood Return (Site #2) Yes 6/20/2018  9:00 AM   Alcohol Cap Used No 6/19/2018  7:15 PM        Opportunity for questions and clarification was provided.       Patient transported with:   [email or web address]

## 2018-06-20 NOTE — PROGRESS NOTES
Admission Assessment. Pt. A/O X4. Respirations even and unlabored. S1 & S2 auscultated. Lungs diminished bilaterally. Abd. Soft and obese. Bowel Sounds active X4 quads. IV patent. Denies any weakness. Oriented to room and call light. Call light within reach. Will monitor hourly.

## 2018-06-20 NOTE — PROGRESS NOTES
Interdisciplinary team rounds were held 6/20/2018 with the following team members:Care Management, Nursing, Physical Therapy and Physician and the patient. Plan of care discussed. See clinical pathway and/or care plan for interventions and desired outcomes.

## 2018-06-20 NOTE — PROGRESS NOTES
Hospitalist Progress Note     Admit Date:  2018  7:52 PM   Name:  Bora Bains   Age:  41 y.o.  :  1980   MRN:  [id number]   PCP:  Ammon Adams MD  Treatment Team: Attending Provider: Griselda Chisholm MD; Consulting Provider: Vincent Sullivan MD; Consulting Provider: Walter Thompson MD; Utilization Review: Pearley Burkitt, RN    Subjective:   Patient is a 41 y.o. female who presents to the ER due to being essentially unresponsive at home. In ER, she was noted to be minimally responsive, hypoglycemic, hypotensive, hypoxic. She was hard to get an IV and a IO had to be placed. Family reports recent abdominal pain a few days ago; unknown if n/v.  Spouse noted that she has been \"nodding off\"/\"passing out\" a lot lately. ABG showed her to have respiratory acidosis with hypoxia and hypercapnia. Improved mentation after narcan. 18: Pt on bipap-  at bedside-c/o chronic shoulder pain- able to converse appropriately. 18:  Patient is resting comfortably with  at bedside. She reports feeling better. Reports HA and asks what medication would be appropriate for her as she understands that she cannot take her home meds for fear of further sedation. 18:  Patient's only complaint is chronic pain. She reports understanding of current condition and possible causes of this.   She reports recent 60lb weight gain as possibly contributing factor to respiratory issues and also reports that she may have taken too much insulin accidentally as she was \"out of it\" the day she presented to the hospital.    Objective:     Patient Vitals for the past 24 hrs:   Temp Pulse Resp BP SpO2   18 0705 99.1 °F (37.3 °C) 90 29 134/72 99 %   18 0600 - 85 12 140/70 100 %   18 0500 - 77 - 118/57 100 %   18 0455 - - - - 100 %   18 0400 - 75 - 124/65 100 %   18 0300 98.6 °F (37 °C) 92 19 134/76 100 %   18 0200 - 81 14 146/60 100 %   18 0129 - - - - 100 %   06/20/18 0100 - 76 18 137/63 100 %   06/20/18 0001 - - - - 99 %   06/20/18 0000 - (!) 103 22 148/61 -   06/19/18 2340 98.4 °F (36.9 °C) 88 20 138/65 96 %   06/19/18 2300 - 88 11 138/65 94 %   06/19/18 2249 - - - - 100 %   06/19/18 2200 - 90 23 130/63 100 %   06/19/18 2100 - 92 (!) 51 119/60 100 %   06/19/18 2000 - 92 26 104/69 99 %   06/19/18 1929 - - - - 99 %   06/19/18 1915 98.4 °F (36.9 °C) 96 22 104/61 96 %   06/19/18 1900 - 94 26 104/61 100 %   06/19/18 1700 - 94 27 118/57 99 %   06/19/18 1654 - 95 (!) 38 118/55 97 %   06/19/18 1545 98.3 °F (36.8 °C) - - - -   06/19/18 1200 98.6 °F (37 °C) (!) 101 (!) 7 (!) 156/114 97 %   06/19/18 1100 - 92 13 133/65 94 %   06/19/18 1033 - 94 11 108/63 98 %     Oxygen Therapy  O2 Sat (%): 99 % (06/20/18 0705)  Pulse via Oximetry: 86 beats per minute (06/20/18 0600)  O2 Device: Room air (06/20/18 0810)  O2 Flow Rate (L/min): 3 l/min (06/19/18 1929)  O2 Temperature: 89.6 °F (32 °C) (06/17/18 2056)  FIO2 (%): 21 % (06/20/18 0810)    Intake/Output Summary (Last 24 hours) at 06/20/18 0924  Last data filed at 06/20/18 0554   Gross per 24 hour   Intake             2492 ml   Output              200 ml   Net             2292 ml         General:    Well nourished. Alert. On bipap- not in resp distress  heent- normal  CV:   RRR. No murmur, rub, or gallop. Lungs:   Clear to auscultation bilaterally. No wheezing, rhonchi, or rales. Abdomen:   Soft, nontender, nondistended. Morbid obesity  Cns- moves all ext, no focal neurological deficits  Extremities: Warm and dry. No cyanosis or edema. Intraosseous access rt leg  Skin:     No rashes or jaundice. Data Review:  I have reviewed all labs, meds, telemetry events, and studies from the last 24 hours.     Recent Results (from the past 24 hour(s))   GLUCOSE, POC    Collection Time: 06/19/18  1:33 PM   Result Value Ref Range    Glucose (POC) 223 (H) 65 - 100 mg/dL   GLUCOSE, POC    Collection Time: 06/19/18  5:00 PM   Result Value Ref Range    Glucose (POC) 231 (H) 65 - 100 mg/dL   GLUCOSE, POC    Collection Time: 06/19/18  9:05 PM   Result Value Ref Range    Glucose (POC) 265 (H) 65 - 532 mg/dL   METABOLIC PANEL, COMPREHENSIVE    Collection Time: 06/20/18  3:28 AM   Result Value Ref Range    Sodium 141 136 - 145 mmol/L    Potassium 3.5 3.5 - 5.1 mmol/L    Chloride 108 (H) 98 - 107 mmol/L    CO2 28 21 - 32 mmol/L    Anion gap 5 (L) 7 - 16 mmol/L    Glucose 182 (H) 65 - 100 mg/dL    BUN 9 6 - 23 MG/DL    Creatinine 0.73 0.6 - 1.0 MG/DL    GFR est AA >60 >60 ml/min/1.73m2    GFR est non-AA >60 >60 ml/min/1.73m2    Calcium 7.8 (L) 8.3 - 10.4 MG/DL    Bilirubin, total 0.2 0.2 - 1.1 MG/DL    ALT (SGPT) 65 12 - 65 U/L    AST (SGOT) 94 (H) 15 - 37 U/L    Alk. phosphatase 87 50 - 136 U/L    Protein, total 6.1 (L) 6.3 - 8.2 g/dL    Albumin 2.4 (L) 3.5 - 5.0 g/dL    Globulin 3.7 (H) 2.3 - 3.5 g/dL    A-G Ratio 0.6 (L) 1.2 - 3.5     CBC WITH AUTOMATED DIFF    Collection Time: 06/20/18  3:28 AM   Result Value Ref Range    WBC 7.4 4.3 - 11.1 K/uL    RBC 3.58 (L) 4.05 - 5.25 M/uL    HGB 9.1 (L) 11.7 - 15.4 g/dL    HCT 28.7 (L) 35.8 - 46.3 %    MCV 80.2 79.6 - 97.8 FL    MCH 25.4 (L) 26.1 - 32.9 PG    MCHC 31.7 31.4 - 35.0 g/dL    RDW 15.9 (H) 11.9 - 14.6 %    PLATELET 465 021 - 644 K/uL    MPV 10.7 (L) 10.8 - 14.1 FL    DF AUTOMATED      NEUTROPHILS 57 43 - 78 %    LYMPHOCYTES 31 13 - 44 %    MONOCYTES 10 4.0 - 12.0 %    EOSINOPHILS 1 0.5 - 7.8 %    BASOPHILS 0 0.0 - 2.0 %    IMMATURE GRANULOCYTES 1 0.0 - 5.0 %    ABS. NEUTROPHILS 4.3 1.7 - 8.2 K/UL    ABS. LYMPHOCYTES 2.3 0.5 - 4.6 K/UL    ABS. MONOCYTES 0.7 0.1 - 1.3 K/UL    ABS. EOSINOPHILS 0.1 0.0 - 0.8 K/UL    ABS. BASOPHILS 0.0 0.0 - 0.2 K/UL    ABS. IMM.  GRANS. 0.0 0.0 - 0.5 K/UL   CORTISOL, AM    Collection Time: 06/20/18  3:28 AM   Result Value Ref Range    Cortisol, a.m. 12.8 7 - 25 ug/dL   GLUCOSE, POC    Collection Time: 06/20/18  7:26 AM   Result Value Ref Range    Glucose (POC) 128 (H) 65 - 100 mg/dL        All Micro Results     Procedure Component Value Units Date/Time    CULTURE, BLOOD [[id number]] Collected:  06/17/18 2114    Order Status:  Completed Specimen:  Blood from Blood Updated:  06/20/18 0746     Special Requests: --        LEFT  Antecubital       Culture result: NO GROWTH 3 DAYS       CULTURE, BLOOD [[id number]]  (Abnormal) Collected:  06/17/18 2112    Order Status:  Completed Specimen:  Blood from Blood Updated:  06/20/18 0742     Special Requests: --        RIGHT  CENTRAL       GRAM STAIN         GRAM POSITIVE COCCI AEROBIC BOTTLE POSITIVE              RESULTS VERIFIED, PHONED TO AND READ BACK  Negro Jenni [facility] [id number]. SBRAZEL     Culture result:         STAPHYLOCOCCUS SPECIES IDENTIFICATION AND SUSCEPTIBILITY TO FOLLOW (A)              SA target DNA sequence not detected within the sample.  Test performed by PCR          Current Meds:  Current Facility-Administered Medications   Medication Dose Route Frequency    VANCOMYCIN INFORMATION NOTE   Other ONCE    insulin glargine (LANTUS) injection 15 Units  15 Units SubCUTAneous DAILY    vancomycin (VANCOCIN) 2000 mg in  ml infusion  2,000 mg IntraVENous Q8H    morphine IR (MS IR) tablet 15 mg  15 mg Oral BID    sodium chloride (NS) flush 20 mL  20 mL InterCATHeter Q8H    heparin (porcine) pf 600 Units  600 Units InterCATHeter Q8H    sodium chloride (NS) flush 20 mL  20 mL InterCATHeter PRN    heparin (porcine) pf 600 Units  600 Units InterCATHeter PRN    DULoxetine (CYMBALTA) capsule 30 mg  30 mg Oral DAILY    levothyroxine (SYNTHROID) tablet 100 mcg  100 mcg Oral ACB    pregabalin (LYRICA) capsule 100 mg  100 mg Oral BID    sodium chloride (NS) flush 5 mL  5 mL InterCATHeter Q8H    sodium chloride (NS) flush 5-10 mL  5-10 mL InterCATHeter PRN    insulin regular (NOVOLIN R, HUMULIN R) injection   SubCUTAneous AC&HS    dextrose 40% (GLUTOSE) oral gel 1 Tube  15 g Oral PRN    glucagon (GLUCAGEN) injection 1 mg  1 mg IntraMUSCular PRN    dextrose (D50W) injection syrg 12.5-25 g  25-50 mL IntraVENous PRN    acetaminophen (TYLENOL) tablet 650 mg  650 mg Oral Q4H PRN    famotidine (PEPCID) tablet 20 mg  20 mg Oral Q12H    heparin (porcine) injection 5,000 Units  5,000 Units SubCUTAneous Q8H    piperacillin-tazobactam (ZOSYN) 3.375 g in 0.9% sodium chloride (MBP/ADV) 100 mL  3.375 g IntraVENous Q8H       Other Studies (last 24 hours):  No results found. Assessment and Plan:     Hospital Problems as of 6/18/2018  Never Reviewed          Codes Class Noted - Resolved POA    JEFF (obstructive sleep apnea) likely ICD-10-CM: G47.33  ICD-9-CM: 327.23  6/18/2018 - Present Unknown        ARF (acute renal failure) (HCC) ICD-10-CM: N17.9  ICD-9-CM: 584.9  6/17/2018 - Present Yes        Hypoglycemia ICD-10-CM: E16.2  ICD-9-CM: 251.2  6/17/2018 - Present Yes        * (Principal)Unresponsive ICD-10-CM: R41.89  ICD-9-CM: 780.09  6/17/2018 - Present Yes        Respiratory acidosis ICD-10-CM: E87.2  ICD-9-CM: 276.2  6/17/2018 - Present Yes        Morbid obesity due to excess calories (HCC) (Chronic) ICD-10-CM: E66.01  ICD-9-CM: 278.01  6/17/2018 - Present Yes        Sepsis ([address] ICD-10-CM: A41.9  ICD-9-CM: 038.9, 995.91  4/1/2016 - Present Yes        Uncontrolled type 2 diabetes mellitus, without long-term current use of insulin (HCC) (Chronic) ICD-10-CM: E11.65  ICD-9-CM: 250.02  4/1/2016 - Present Yes    Chronic pain          PLAN:    Unresponsiveness  - Resolved  - Possibly related to combination of hypoglycemia, pain medication, hypercapnea. Hypercapnea/acidosis  - Resolved  - On bipap at night  - Pulmonary following.  - Attempting to get home CPAP/Trilogy    DANIEL  - Resolved  - Possibly 2/2 recent change in bp medications  - Recent BUN/Cr  on 6/5/18 was normal at pcp office.   - Nephrology consulted    Headache  - Tylenol for now    Sepsis/sirs  - No source of infection  - Continue broad spectrum antibiotics.   - One of 2 bld cx positive for G+ cocci, likely contaminant, but awaiting speciation    Hypotension  - Possibly 2/2 BP meds vs infectious etiology  - Resolved    DM type 2  - Was on long-acting insulin on admission  - Was hypoglycemic on admission  - Improved now  - Restarting lantus today at much lower dosing    Chronic pain  - Follows with pain management  - Holding meds 2/2 obtundation on admission  - Slowly titrate up again as tolerated per pain management recommendations    Morbid obesity  - Reports recent 60lb weight gain  - Would benefit from health  as outpatient    DC planning/Dispo:    DVT ppx:  heparin    Signed:  Tianna Darden MD

## 2018-06-20 NOTE — PROGRESS NOTES
Saw pt in interdisciplinarily rounds, plan of care and discharge date/ location discussed. Per the hospitalitis plan is to transfer pt to a m/s bed or possible home this afternoon. Per Dr. Geovanna Palacios, pt will need to have a sleep study to evaluate for at home BIPAP.

## 2018-06-20 NOTE — PROGRESS NOTES
Bedside and Verbal shift change report given to Lauren Millard (oncoming nurse) by Leslie Richardson RN (offgoing nurse). Report included the following information SBAR, Kardex, Intake/Output, MAR, Recent Results and Cardiac Rhythm SR. Patient resting in bed on bipap soundly.

## 2018-06-20 NOTE — PROGRESS NOTES
Bedside and Verbal shift change report given to Deisy Nava RN (oncoming nurse) by Lakshmi Cavanaugh RN (offgoing nurse). Report included the following information SBAR, Kardex, ED Summary, Intake/Output, MAR, Recent Results and Cardiac Rhythm SR. Patient resting in bed with family present. No needs verbalized.

## 2018-06-20 NOTE — PROGRESS NOTES
Dayanna Bains  Admission Date: 6/17/2018             Daily Progress Note: 6/20/2018    The patient's chart is reviewed and the patient is discussed with the staff. Dayanna Bains is a 46yoF who has JEFF (trying to get expedited PSG prior to hospitalization) with chronic pain who was admitted on 6/17 with DANIEL, fever, leukocytosis, lethargy and respiratory acidosis. Subjective:     Wasn't able to transfer to floor last night because nursing supervisor said BiPAP at night (with sleep) not allowed on floor.   No fevers, renal failure resolved  Complains of pain  Mentions that she had a right nasal ulcer days prior to her illness    Current Facility-Administered Medications   Medication Dose Route Frequency    vancomycin (VANCOCIN) 2000 mg in  ml infusion  2,000 mg IntraVENous Q8H    morphine IR (MS IR) tablet 15 mg  15 mg Oral BID    sodium chloride (NS) flush 20 mL  20 mL InterCATHeter Q8H    heparin (porcine) pf 600 Units  600 Units InterCATHeter Q8H    sodium chloride (NS) flush 20 mL  20 mL InterCATHeter PRN    heparin (porcine) pf 600 Units  600 Units InterCATHeter PRN    DULoxetine (CYMBALTA) capsule 30 mg  30 mg Oral DAILY    levothyroxine (SYNTHROID) tablet 100 mcg  100 mcg Oral ACB    pregabalin (LYRICA) capsule 100 mg  100 mg Oral BID    sodium chloride (NS) flush 5 mL  5 mL InterCATHeter Q8H    sodium chloride (NS) flush 5-10 mL  5-10 mL InterCATHeter PRN    insulin regular (NOVOLIN R, HUMULIN R) injection   SubCUTAneous AC&HS    dextrose 40% (GLUTOSE) oral gel 1 Tube  15 g Oral PRN    glucagon (GLUCAGEN) injection 1 mg  1 mg IntraMUSCular PRN    dextrose (D50W) injection syrg 12.5-25 g  25-50 mL IntraVENous PRN    acetaminophen (TYLENOL) tablet 650 mg  650 mg Oral Q4H PRN    famotidine (PEPCID) tablet 20 mg  20 mg Oral Q12H    heparin (porcine) injection 5,000 Units  5,000 Units SubCUTAneous Q8H    piperacillin-tazobactam (ZOSYN) 3.375 g in 0.9% sodium chloride (MBP/ADV) 100 mL  3.375 g IntraVENous Q8H       Review of Systems    Constitutional: negative for fever, chills, sweats  Cardiovascular: negative for chest pain, palpitations, syncope, edema  Gastrointestinal:  negative for dysphagia, reflux, vomiting, diarrhea, abdominal pain, or melena  Neurologic:  negative for focal weakness, numbness, headache    Objective:     Vitals:    06/20/18 0455 06/20/18 0500 06/20/18 0525 06/20/18 0600   BP:  118/57  140/70   Pulse:  77  85   Resp:    12   Temp:       SpO2: 100% 100%  100%   Weight:   (!) 428 lb 11.2 oz (194.5 kg)    Height:         Intake and Output:   06/18 1901 - 06/20 0700  In: 4878.3 [P.O.:660; I.V.:4218.3]  Out: 3700 [Urine:3700]       Physical Exam:   Constitution:  the patient is well developed and in no acute distress  EENMT:  Sclera clear, pupils equal, oral mucosa moist  Respiratory: CTAB  Cardiovascular:  RRR without M,G,R  Gastrointestinal: soft and non-tender; with positive bowel sounds. Musculoskeletal: warm without cyanosis. There is no lower leg edema.   Skin:  no jaundice or rashes, no wounds   Neurologic: no gross neuro deficits     Psychiatric:  alert and oriented x 3    CXR:   None today    LAB  Hyperglycemic  Recent Labs      06/19/18   2105  06/19/18   1700  06/19/18   1333  06/19/18   0833  06/18/18   2136   GLUCPOC  265*  231*  223*  135*  252*      Recent Labs      06/20/18   0328  06/19/18   0324  06/18/18   0310  06/17/18 2020   WBC  7.4  8.1  15.2*  15.9*   HGB  9.1*  9.2*  11.1*  11.5*   HCT  28.7*  29.1*  34.0*  37.2   PLT  168  172  261  288     Recent Labs      06/20/18   0328  06/19/18   0324  06/18/18   0310   06/17/18 2020   NA  141  140  134*   < >  131*   K  3.5  3.7  4.8   < >  3.8   CL  108*  106  98   < >  96*   CO2  28  28  26   < >  28   GLU  182*  158*  177*   < >  54*   BUN  9  19  33*   < >  37*   CREA  0.73  0.85  2.46*   < >  3.63*   MG   --    --   2.3   --   2.3   CA  7.8*  7.9*  8.3   < >  9.4   PHOS   --    -- 5.3*   --   6.3*   ALB  2.4*  2.4*  3.1*   --   3.5   TBILI  0.2  0.3  0.4   --   0.5   ALT  65  51  40   --   43   SGOT  94*  109*  37   --   25    < > = values in this interval not displayed. Recent Labs      06/19/18   0600  06/18/18   0735  06/18/18   0412   PH  7.41  7.30*  7.27*   PCO2  38  50*  53*   PO2  103  119*  82   HCO3  23  24  24     Recent Labs      06/18/18   0310  06/17/18   2302   LAC  0.7  1.4         Assessment:  (Medical Decision Making)     Hospital Problems  Never Reviewed          Codes Class Noted POA    JEFF (obstructive sleep apnea) likely ICD-10-CM: G47.33  ICD-9-CM: 327.23  6/18/2018 Unknown    With snoring, daytime hypersomnolence, obesity, and witnessed apneas. She needs expedited split night study and if possible we need to arrange interval CPAP prior to split night study upon discharge    ARF (acute renal failure) ([address] ICD-10-CM: N17.9  ICD-9-CM: 584.9  6/17/2018 Yes        Hypoglycemia ICD-10-CM: E16.2  ICD-9-CM: 251.2  6/17/2018 Yes        * (Principal)Unresponsive ICD-10-CM: R41.89  ICD-9-CM: 780.09  6/17/2018 Yes        Respiratory acidosis ICD-10-CM: E87.2  ICD-9-CM: 276.2  6/17/2018 Yes    resolved    Morbid obesity due to excess calories (HCC) (Chronic) ICD-10-CM: E66.01  ICD-9-CM: 278.01  6/17/2018 Yes        Sepsis [facility] ICD-10-CM: A41.9  ICD-9-CM: 038.9, 995.91  4/1/2016 Yes        Uncontrolled type 2 diabetes mellitus, without long-term current use of insulin (HCC) (Chronic) ICD-10-CM: E11.65  ICD-9-CM: 250.02  4/1/2016 Yes          GPC bacteremia:  ? Nasal ulcer source vs contaminant.  reports she has had staph in past  Untreated JEFF:  Will expedite PSG. Plan:  (Medical Decision Making)     --Patient has chronic JEFF which was previously untreated, no longer has acute respiratory failure of any kind. If she goes to floor tonight, will use CPAP on autoset. --We will work to expedite PSG this week if possible.   --f/u at [facility]  --f/u TSH with PCP, likely needs retesting. More than 50% of the time documented was spent in face-to-face contact with the patient and in the care of the patient on the floor/unit where the patient is located.     Sayra Owens MD

## 2018-06-20 NOTE — PROGRESS NOTES
TRANSFER - IN REPORT:    Verbal report received from Sridhar Davis RN(name) on Dayanna Bains  being received from ICU(unit) for routine progression of care      Report consisted of patients Situation, Background, Assessment and   Recommendations(SBAR). Information from the following report(s) SBAR, Kardex, Procedure Summary, Intake/Output, MAR and Recent Results was reviewed with the receiving nurse. Opportunity for questions and clarification was provided. Assessment completed upon patients arrival to unit and care assumed.

## 2018-06-20 NOTE — PROGRESS NOTES
Vancomycin Consult (Day 3)    MD ordering: Sherry MORFIN following? no  Indication: sepsis  DOT:  ?  days  Goal level(s): 15 - 20    Ht Readings from Last 1 Encounters:   18 5' 5\" (1.651 m)      Wt Readings from Last 1 Encounters:   18 (!) 194.4 kg (428 lb 8 oz)       Allergies as of 2018 - Review Complete 2018   Allergen Reaction Noted    Codeine Other (comments) 2008     Current Antimicrobial Therapy (168h ago through future)      Ordered     Start Stop      18 0702  vancomycin (VANCOCIN) 2000 mg in  ml infusion  2,000 mg,   IntraVENous,   EVERY 12 HOURS      18 0800 --    18 2254  piperacillin-tazobactam (ZOSYN) 3.375 g in 0.9% sodium chloride (MBP/ADV) 100 mL  3.375 g,   IntraVENous,   EVERY 8 HOURS      18 0500 --            All Micro Results       Procedure Component Value Units Date/Time      CULTURE, BLOOD [[id number]] Collected:  18    Order Status:  Completed Specimen:  Blood from Blood Updated:  18    CULTURE, BLOOD [[id number]] Collected:  18    Order Status:  Completed Specimen:  Blood from Blood Updated:  18            Temp (24hrs), Av.5 °F (36.9 °C), Min:97 °F (36.1 °C), Max:98.9 °F (37.2 °C)    UOP: mL/kg/hr  Dosing weight: 194 kg (actual weight)  41 y.o. Date:  Dose/Freq Admin Times Level/Time:    1 gram 2309  0145     2 grams IV every 12 hours 0831  2 grams IV 1029  1857 Trough is 8.0 at 0834 am    2 grams IV every 8 hours 0359 (11) (19) Trough ordered for 10 am     Recent Labs      18   0310  18   2302  18   BUN  33*  38*   --   37*   CREA  2.46*  3.26*   --   3.63*   WBC  15.2*   --    --   15.9*   PCT   --   0.3   --    --    LAC  0.7  1.4   --    --    LACPOC   --    --   2.6*   --      Estimated Creatinine Clearance: 55.4 mL/min (based on Cr of 2.46).     A/P:  Vancomycin trough ordered for 10 am.  Continue with Vancomycin 2 grams IV every 8 hours.       Alka CampbellD, BCPS

## 2018-06-20 NOTE — PROGRESS NOTES
Patient notified this RN of anxiety/nervousness. Stated \"I feel like I am going through withdrawals. \"  Patient then provided this RN with current medication list.  Dr. Claudia Hdz updated on status. Orders received for Morphine IR 15 mg BID to include now. Telephone order with readback for clarification.

## 2018-06-20 NOTE — PROGRESS NOTES
A follow up visit was made to the patient. Emotional support, spiritual presence and   Prayer assurance were provided.       [employer]

## 2018-06-21 VITALS
SYSTOLIC BLOOD PRESSURE: 152 MMHG | RESPIRATION RATE: 24 BRPM | DIASTOLIC BLOOD PRESSURE: 86 MMHG | HEIGHT: 65 IN | TEMPERATURE: 97.8 F | HEART RATE: 86 BPM | BODY MASS INDEX: 48.82 KG/M2 | WEIGHT: 293 LBS | OXYGEN SATURATION: 91 %

## 2018-06-21 LAB
ARTERIAL PATENCY WRIST A: POSITIVE
BASE EXCESS BLDA CALC-SCNC: 1 MMOL/L (ref 0–3)
BDY SITE: ABNORMAL
COHGB MFR BLD: 0.3 % (ref 0.5–1.5)
DO-HGB BLD-MCNC: 7 % (ref 0–5)
GLUCOSE BLD STRIP.AUTO-MCNC: 134 MG/DL (ref 65–100)
GLUCOSE BLD STRIP.AUTO-MCNC: 210 MG/DL (ref 65–100)
HCO3 BLDA-SCNC: 24 MMOL/L (ref 22–26)
HGB BLDMV-MCNC: 10.6 GM/DL (ref 11.7–15)
METHGB MFR BLD: 0.3 % (ref 0–1.5)
OXYHGB MFR BLDA: 92.1 % (ref 94–97)
PCO2 BLDA: 32 MMHG (ref 35–45)
PH BLDA: 7.49 [PH] (ref 7.35–7.45)
PO2 BLDA: 66 MMHG (ref 80–105)
SAO2 % BLD: 93 % (ref 92–98.5)
SERVICE CMNT-IMP: ABNORMAL

## 2018-06-21 PROCEDURE — 82803 BLOOD GASES ANY COMBINATION: CPT

## 2018-06-21 PROCEDURE — 74011250637 HC RX REV CODE- 250/637: Performed by: FAMILY MEDICINE

## 2018-06-21 PROCEDURE — 74011250636 HC RX REV CODE- 250/636: Performed by: FAMILY MEDICINE

## 2018-06-21 PROCEDURE — 99232 SBSQ HOSP IP/OBS MODERATE 35: CPT | Performed by: INTERNAL MEDICINE

## 2018-06-21 PROCEDURE — 36600 WITHDRAWAL OF ARTERIAL BLOOD: CPT

## 2018-06-21 PROCEDURE — 82962 GLUCOSE BLOOD TEST: CPT

## 2018-06-21 PROCEDURE — 74011250637 HC RX REV CODE- 250/637: Performed by: HOSPITALIST

## 2018-06-21 PROCEDURE — 74011000258 HC RX REV CODE- 258: Performed by: FAMILY MEDICINE

## 2018-06-21 PROCEDURE — 74011636637 HC RX REV CODE- 636/637: Performed by: FAMILY MEDICINE

## 2018-06-21 RX ORDER — PREGABALIN 100 MG/1
100 CAPSULE ORAL 2 TIMES DAILY
Qty: 30 CAP | Refills: 0 | Status: SHIPPED
Start: 2018-06-21 | End: 2018-07-10

## 2018-06-21 RX ORDER — SIMETHICONE 80 MG
80 TABLET,CHEWABLE ORAL
Status: COMPLETED | OUTPATIENT
Start: 2018-06-21 | End: 2018-06-21

## 2018-06-21 RX ADMIN — INSULIN HUMAN 4 UNITS: 100 INJECTION, SOLUTION PARENTERAL at 11:32

## 2018-06-21 RX ADMIN — PREGABALIN 100 MG: 50 CAPSULE ORAL at 08:54

## 2018-06-21 RX ADMIN — VANCOMYCIN HYDROCHLORIDE 2000 MG: 10 INJECTION, POWDER, LYOPHILIZED, FOR SOLUTION INTRAVENOUS at 05:23

## 2018-06-21 RX ADMIN — Medication 5 ML: at 05:24

## 2018-06-21 RX ADMIN — Medication 20 ML: at 05:24

## 2018-06-21 RX ADMIN — DULOXETINE HYDROCHLORIDE 30 MG: 30 CAPSULE, DELAYED RELEASE ORAL at 09:23

## 2018-06-21 RX ADMIN — FAMOTIDINE 20 MG: 20 TABLET ORAL at 08:54

## 2018-06-21 RX ADMIN — LEVOTHYROXINE SODIUM 100 MCG: 100 TABLET ORAL at 05:23

## 2018-06-21 RX ADMIN — HEPARIN SODIUM 5000 UNITS: 5000 INJECTION, SOLUTION INTRAVENOUS; SUBCUTANEOUS at 08:54

## 2018-06-21 RX ADMIN — PIPERACILLIN SODIUM AND TAZOBACTAM SODIUM 3.38 G: 3; .375 INJECTION, POWDER, LYOPHILIZED, FOR SOLUTION INTRAVENOUS at 05:23

## 2018-06-21 RX ADMIN — SIMETHICONE CHEW TAB 80 MG 80 MG: 80 TABLET ORAL at 11:32

## 2018-06-21 RX ADMIN — SODIUM CHLORIDE, PRESERVATIVE FREE 600 UNITS: 5 INJECTION INTRAVENOUS at 14:47

## 2018-06-21 RX ADMIN — SODIUM CHLORIDE, PRESERVATIVE FREE 600 UNITS: 5 INJECTION INTRAVENOUS at 05:24

## 2018-06-21 RX ADMIN — Medication 20 ML: at 14:47

## 2018-06-21 RX ADMIN — MORPHINE SULFATE 15 MG: 15 TABLET ORAL at 08:54

## 2018-06-21 RX ADMIN — HEPARIN SODIUM 5000 UNITS: 5000 INJECTION, SOLUTION INTRAVENOUS; SUBCUTANEOUS at 01:50

## 2018-06-21 NOTE — PROGRESS NOTES
Problem: Pressure Injury - Risk of  Goal: *Prevention of pressure injury  Document Vishal Scale and appropriate interventions in the flowsheet.    Outcome: Progressing Towards Goal  Pressure Injury Interventions:       Moisture Interventions: Absorbent underpads    Activity Interventions: Increase time out of bed    Mobility Interventions: HOB 30 degrees or less, PT/OT evaluation    Nutrition Interventions: Document food/fluid/supplement intake, Discuss nutritional consult with provider, Offer support with meals,snacks and hydration    Friction and Shear Interventions: HOB 30 degrees or less

## 2018-06-21 NOTE — PROGRESS NOTES
Per [facility], patient was offered an appointment for in-lab sleep study for tonight, but patient declined and scheduled for Sunday at 8:30PM at the [facility] sleep lab.

## 2018-06-21 NOTE — DISCHARGE SUMMARY
Hospitalist Discharge Summary     Patient ID:  Anastacia Bacon  [id number]  41 y.o.  1980  Admit date: 6/17/2018  7:52 PM  Discharge date and time: 6/21/2018  Attending: Elsi Rodrigez MD  PCP:  Aurea Diana MD  Treatment Team: Attending Provider: Elsi Rodrigez MD; Consulting Provider: Mary Corado MD; Utilization Review: Jerad Fischer RN    Principal Diagnosis Unresponsive   Principal Problem:    Unresponsive (6/17/2018)    Active Problems:    Sepsis ([address] (4/1/2016)      Uncontrolled type 2 diabetes mellitus, without long-term current use of insulin ([address] (4/1/2016)      ARF (acute renal failure) ([address] (6/17/2018)      Hypoglycemia (6/17/2018)      Respiratory acidosis (6/17/2018)      Morbid obesity due to excess calories ([address] (6/17/2018)      JEFF (obstructive sleep apnea) likely (6/18/2018)             Hospital Course:  Please refer to the admission H&P for details of presentation. In summary, the patient is a 41 y. o. female who presents to the ER on 6/17/18 due to being essentially unresponsive at home. [facility], she was noted to be minimally responsive, hypoglycemic, hypotensive, hypoxic.  She was hard to get an IV and a IO had to be placed.  Family reports recent abdominal pain a few days ago; unknown if n/v.  Spouse noted that she has been \"nodding off\"/\"passing out\" a lot lately.   ABG showed her to have respiratory acidosis with hypoxia and hypercapnia. Improved mentation after narcan.     Patient improved. Pulmonology followed throughout stay. Dr. Padmini Pierre personally helped expedite a rapid sleep study for evaluation of undiagnosed JEFF. Patient was offered sleep study on night of discharge, she declined and was scheduled on Sunday night. Importance of follow up with this study was discussed with patient and , and they voiced understanding.     Significant Diagnostic Studies:       Labs: Results:       Chemistry Recent Labs      06/20/18   0328  06/19/18   0324   GLU 182*  158*   NA  141  140   K  3.5  3.7   CL  108*  106   CO2  28  28   BUN  9  19   CREA  0.73  0.85   CA  7.8*  7.9*   AGAP  5*  6*   AP  87  82   TP  6.1*  5.8*   ALB  2.4*  2.4*   GLOB  3.7*  3.4   AGRAT  0.6*  0.7*      CBC w/Diff Recent Labs      06/20/18 0328  06/19/18   0324   WBC  7.4  8.1   RBC  3.58*  3.59*   HGB  9.1*  9.2*   HCT  28.7*  29.1*   PLT  168  172   GRANS  57  60   LYMPH  31  25   EOS  1  2      Cardiac Enzymes No results for input(s): CPK, CKND1, ALYCE in the last 72 hours. No lab exists for component: CKRMB, TROIP   Coagulation No results for input(s): PTP, INR, APTT in the last 72 hours. No lab exists for component: INREXT    Lipid Panel No results found for: CHOL, CHOLPOCT, CHOLX, CHLST, CHOLV, 225346, HDL, LDL, LDLC, DLDLP, 813295, VLDLC, VLDL, TGLX, TRIGL, TRIGP, TGLPOCT, CHHD, CHHDX   BNP No results for input(s): BNPP in the last 72 hours. Liver Enzymes Recent Labs      06/20/18   0328   TP  6.1*   ALB  2.4*   AP  87   SGOT  94*      Thyroid Studies Lab Results   Component Value Date/Time    TSH 0.253 (L) 06/19/2018 08:34 AM            Discharge Exam:  Visit Vitals    /86 (BP 1 Location: Left arm, BP Patient Position: At rest)    Pulse 86    Temp 97.8 °F (36.6 °C)    Resp 24    Ht 5' 5\" (1.651 m)    Wt (!) 194.5 kg (428 lb 11.2 oz)    SpO2 91%    BMI 71.34 kg/m2     General appearance: alert, cooperative, no distress, appears stated age  Lungs: clear to auscultation bilaterally  Heart: regular rate and rhythm, S1, S2 normal, no murmur, click, rub or gallop  Abdomen: soft, non-tender. Bowel sounds normal. No masses,  no organomegaly  Extremities: no cyanosis or edema  Neurologic: Grossly normal    Disposition: home  Discharge Condition: stable  Patient Instructions:   Current Discharge Medication List      CONTINUE these medications which have CHANGED    Details   insulin glargine 300 unit/mL (1.5 mL) inpn 40 Units by SubCUTAneous route nightly for 5 days.   Qty: 0.67 mL, Refills: 0      !! pregabalin (LYRICA) 100 mg capsule Take 1 Cap by mouth two (2) times a day. Max Daily Amount: 200 mg. Qty: 30 Cap, Refills: 0    Associated Diagnoses: Uncontrolled type 2 diabetes mellitus with hyperglycemia, without long-term current use of insulin ([facility]       ! ! - Potential duplicate medications found. Please discuss with provider. CONTINUE these medications which have NOT CHANGED    Details   lidocaine 5 % topical cream Apply  to affected area two (2) times daily as needed for Itching. atorvastatin (LIPITOR) 40 mg tablet Take 40 mg by mouth daily. DULoxetine (CYMBALTA) 60 mg capsule Take 60 mg by mouth daily. exenatide microspheres 2 mg/0.85 mL atIn 2 mg by SubCUTAneous route every seven (7) days. fenofibrate micronized (LOFIBRA) 67 mg capsule Take 67 mg by mouth daily. fluticasone (FLONASE) 50 mcg/actuation nasal spray 2 Sprays by Both Nostrils route daily. insulin lispro (HUMALOG) 100 unit/mL kwikpen 35 Units by SubCUTAneous route Before breakfast, lunch, and dinner. losartan-hydroCHLOROthiazide (HYZAAR) 50-12.5 mg per tablet Take 1 Tab by mouth daily. metFORMIN ER (GLUCOPHAGE XR) 500 mg tablet Take 1,000 mg by mouth two (2) times a day. morphine IR (MS IR) 15 mg tablet Take 15 mg by mouth two (2) times a day. ondansetron (ZOFRAN ODT) 4 mg disintegrating tablet Take 4 mg by mouth every eight (8) hours as needed for Nausea. !! pregabalin (LYRICA) 150 mg capsule Take 150 mg by mouth three (3) times daily. tiZANidine (ZANAFLEX) 4 mg tablet Take 4 mg by mouth three (3) times daily as needed. levothyroxine (SYNTHROID) 75 mcg tablet Take 100 mcg by mouth Daily (before breakfast). diclofenac (VOLTAREN) 1 % gel Apply  to affected area four (4) times daily as needed for Pain. benzoyl peroxide-erythromycin (BENZAMYCIN) 3-5 % topical gel Apply  to affected area two (2) times a day.       Liraglutide (VICTOZA) 0.6 mg/0.1 mL (18 mg/3 mL) pnij 0.6 mg by SubCUTAneous route daily. !! - Potential duplicate medications found. Please discuss with provider.           Activity: Activity as tolerated  Diet: Diabetic Diet  Wound Care: None needed    Follow-up  ·   Sleep study on Sunday as scheduled  ·   PCP in 3-5 days  ·   Pulmonology 1-2 weeks  Time spent to discharge patient 35 minutes  Signed:  Jerson Quiroz MD  6/21/2018  3:05 PM

## 2018-06-21 NOTE — DISCHARGE INSTRUCTIONS
DISCHARGE SUMMARY from Nurse    The following personal items are in your possession at time of discharge:    Dental Appliances: None  Visual Aid: Glasses, With patient     Home Medications: None  Jewelry: None  Clothing: None  Other Valuables: None             PATIENT INSTRUCTIONS:    After general anesthesia or intravenous sedation, for 24 hours or while taking prescription Narcotics:  · Limit your activities  · Do not drive and operate hazardous machinery  · Do not make important personal or business decisions  · Do  not drink alcoholic beverages  · If you have not urinated within 8 hours after discharge, please contact your surgeon on call. Report the following to your surgeon:  · Excessive pain, swelling, redness or odor of or around the surgical area  · Temperature over 100.5  · Nausea and vomiting lasting longer than 4 hours or if unable to take medications  · Any signs of decreased circulation or nerve impairment to extremity: change in color, persistent  numbness, tingling, coldness or increase pain  · Any questions        What to do at Home:  Recommended activity: Activity as tolerated, per MD    If you experience any of the following symptoms fever>101, pain unrelieved with medication, nausea/vomiting, shortness of breath, dizziness/fainting, chest pain. , please follow up with your doctor. *  Please give a list of your current medications to your Primary Care Provider. *  Please update this list whenever your medications are discontinued, doses are      changed, or new medications (including over-the-counter products) are added. *  Please carry medication information at all times in case of emergency situations. These are general instructions for a healthy lifestyle:    No smoking/ No tobacco products/ Avoid exposure to second hand smoke    Surgeon General's Warning:  Quitting smoking now greatly reduces serious risk to your health.     Obesity, smoking, and sedentary lifestyle greatly increases your risk for illness    A healthy diet, regular physical exercise & weight monitoring are important for maintaining a healthy lifestyle    You may be retaining fluid if you have a history of heart failure or if you experience any of the following symptoms:  Weight gain of 3 pounds or more overnight or 5 pounds in a week, increased swelling in our hands or feet or shortness of breath while lying flat in bed. Please call your doctor as soon as you notice any of these symptoms; do not wait until your next office visit. Recognize signs and symptoms of STROKE:    F-face looks uneven    A-arms unable to move or move unevenly    S-speech slurred or non-existent    T-time-call 911 as soon as signs and symptoms begin-DO NOT go       Back to bed or wait to see if you get better-TIME IS BRAIN. Warning Signs of HEART ATTACK     Call 911 if you have these symptoms:   Chest discomfort. Most heart attacks involve discomfort in the center of the chest that lasts more than a few minutes, or that goes away and comes back. It can feel like uncomfortable pressure, squeezing, fullness, or pain.  Discomfort in other areas of the upper body. Symptoms can include pain or discomfort in one or both arms, the back, neck, jaw, or stomach.  Shortness of breath with or without chest discomfort.  Other signs may include breaking out in a cold sweat, nausea, or lightheadedness. Don't wait more than five minutes to call 911 - MINUTES MATTER! Fast action can save your life. Calling 911 is almost always the fastest way to get lifesaving treatment. Emergency Medical Services staff can begin treatment when they arrive -- up to an hour sooner than if someone gets to the hospital by car. The discharge information has been reviewed with the patient. The patient verbalized understanding.     Discharge medications reviewed with the patient and appropriate educational materials and side effects teaching were provided. Learning About Bariatric Surgery  What is bariatric surgery? Bariatric surgery is surgery to help you lose weight. This type of surgery is only used for people who are very overweight and have not been able to lose weight with diet and exercise. This surgery makes the stomach smaller. Some types of surgery also change the connection between your stomach and intestines. How is bariatric surgery done? Bariatric surgery may be either \"open\" or \"laparoscopic. \" Open surgery is done through a large cut (incision) in the belly. Laparoscopic surgery is done through several small cuts. The doctor puts a lighted tube, or scope, and other surgical tools through small cuts in your belly. The doctor is able to see your organs with the scope. There are different types of bariatric surgery. Gastric sleeve surgery  The surgery is usually done through several small incisions in the belly. The doctor removes more than half of your stomach. This leaves a thin sleeve, or tube, that is about the size of a banana. Because part of your stomach has been removed, this can't be reversed. Shea-en-Y gastric bypass surgery  Shea-en-Y (say \"perla-en-why\") surgery changes the connection between the stomach and the intestines. The doctor separates a section of your stomach from the rest of your stomach. This makes a small pouch. The new pouch will hold the food you eat. The doctor connects the stomach pouch to the middle part of the small intestine. Gastric banding surgery  The surgery is usually done through several small incisions in the belly. The doctor wraps a band around the upper part of the stomach. This creates a small pouch. The small size of the pouch means that you will get full after you eat just a small amount of food. The doctor can inflate or deflate the band to adjust the size. This lets the doctor adjust how quickly food passes from the new pouch into the stomach.  It does not change the connection between the stomach and the intestines. What can you expect after the surgery? You may stay in the hospital for one or more days after the surgery. How long you stay depends on the type of surgery you had. Most people need 2 to 4 weeks before they are ready to get back to their usual routine. For the first 2 to 6 weeks after surgery, you probably will need to follow a liquid or soft diet. Bit by bit, you will be able to eat more solid foods. Your doctor may advise you to work with a dietitian. This way you'll be sure to get enough protein, vitamins, and minerals while you are losing weight. Even with a healthy diet, you may need to take vitamin and mineral supplements. After surgery, you will not be able to eat very much at one time. You will get full quickly. Try not to eat too much at one time or eat foods that are high in fat or sugar. If you do, you may vomit, get stomach pain, or have diarrhea. You probably will lose weight very quickly in the first few months after surgery. As time goes on, your weight loss will slow down. You will have regular doctor visits to check how you are doing. Think of bariatric surgery as a tool to help you lose weight. It isn't an instant fix. You will still need to eat a healthy diet and get regular exercise. This will help you reach your weight goal and avoid regaining the weight you lose. Follow-up care is a key part of your treatment and safety. Be sure to make and go to all appointments, and call your doctor if you are having problems. It's also a good idea to know your test results and keep a list of the medicines you take. Where can you learn more? Go to http://mayte-nina.info/. Enter G469 in the search box to learn more about \"Learning About Bariatric Surgery. \"  Current as of: October 13, 2016  Content Version: 11.4  © 1515-8348 [employer].  Care instructions adapted under license by [employer] (which disclaims liability or warranty for this information). If you have questions about a medical condition or this instruction, always ask your healthcare professional. [place] [address] any warranty or liability for your use of this information.

## 2018-06-21 NOTE — PROGRESS NOTES
Spiritual Care visit . Follow up visit.  spent time with couple conversing about their jose and community of faithl. Affirmed their jose, and prayed for patient's healing. Couple had requested meal vouchers for patient's son. Patient Relations Representative was not present today.  did not have access to vouchers, so he sent a message to Patient  Sebas Duckworth, who ordered a guest tray from [employer].     Visit by Marquis Samir M.Ed., Th.B. ,Staff

## 2018-06-21 NOTE — PROGRESS NOTES
Daaynna Bains  Admission Date: 6/17/2018             Daily Progress Note: 6/21/2018    The patient's chart is reviewed and the patient is discussed with the staff. Dayanna Bains is a 46yoF who has JEFF (trying to get expedited PSG prior to hospitalization) with chronic pain who was admitted on 6/17 with DANIEL, fever, leukocytosis, lethargy and respiratory acidosis. Subjective:     Wasn't able to transfer to floor last night because nursing supervisor said BiPAP at night (with sleep) not allowed on floor.   No fevers, renal failure resolved  Complains of pain  Mentions that she had a right nasal ulcer days prior to her illness    Current Facility-Administered Medications   Medication Dose Route Frequency    insulin glargine (LANTUS) injection 20 Units  20 Units SubCUTAneous DAILY    vancomycin (VANCOCIN) 2000 mg in  ml infusion  2,000 mg IntraVENous Q8H    morphine IR (MS IR) tablet 15 mg  15 mg Oral BID    sodium chloride (NS) flush 20 mL  20 mL InterCATHeter Q8H    heparin (porcine) pf 600 Units  600 Units InterCATHeter Q8H    sodium chloride (NS) flush 20 mL  20 mL InterCATHeter PRN    heparin (porcine) pf 600 Units  600 Units InterCATHeter PRN    DULoxetine (CYMBALTA) capsule 30 mg  30 mg Oral DAILY    levothyroxine (SYNTHROID) tablet 100 mcg  100 mcg Oral ACB    pregabalin (LYRICA) capsule 100 mg  100 mg Oral BID    sodium chloride (NS) flush 5 mL  5 mL InterCATHeter Q8H    sodium chloride (NS) flush 5-10 mL  5-10 mL InterCATHeter PRN    insulin regular (NOVOLIN R, HUMULIN R) injection   SubCUTAneous AC&HS    dextrose 40% (GLUTOSE) oral gel 1 Tube  15 g Oral PRN    glucagon (GLUCAGEN) injection 1 mg  1 mg IntraMUSCular PRN    dextrose (D50W) injection syrg 12.5-25 g  25-50 mL IntraVENous PRN    acetaminophen (TYLENOL) tablet 650 mg  650 mg Oral Q4H PRN    famotidine (PEPCID) tablet 20 mg  20 mg Oral Q12H    heparin (porcine) injection 5,000 Units  5,000 Units SubCUTAneous Q8H    piperacillin-tazobactam (ZOSYN) 3.375 g in 0.9% sodium chloride (MBP/ADV) 100 mL  3.375 g IntraVENous Q8H       Review of Systems    Constitutional: negative for fever, chills, sweats  Cardiovascular: negative for chest pain, palpitations, syncope, edema  Gastrointestinal:  negative for dysphagia, reflux, vomiting, diarrhea, abdominal pain, or melena  Neurologic:  negative for focal weakness, numbness, headache    Objective:     Vitals:    06/20/18 2300 06/21/18 0110 06/21/18 0348 06/21/18 0436   BP:  159/79  133/81   Pulse:  70  76   Resp:  18  18   Temp:  98 °F (36.7 °C)  98.1 °F (36.7 °C)   SpO2: 98% 99% 98% 100%   Weight:       Height:         Intake and Output:   06/19 1901 - 06/21 0700  In: 1440 [P.O.:240; I.V.:1200]  Out: 200 [Urine:200]       Physical Exam:   Constitution:  the patient is well developed and in no acute distress  EENMT:  Sclera clear, pupils equal, oral mucosa moist  Respiratory: CTAB  Cardiovascular:  RRR without M,G,R  Gastrointestinal: soft and non-tender; with positive bowel sounds. Musculoskeletal: warm without cyanosis. There is no lower leg edema. Skin:  no jaundice or rashes, no wounds   Neurologic: no gross neuro deficits     Psychiatric:  alert and oriented x 3    CXR:   None today    LAB  Hyperglycemic  Recent Labs      06/20/18   2238  06/20/18   1558  06/20/18   1141  06/20/18   0726  06/19/18   2105   GLUCPOC  227*  254*  225*  128*  265*      Recent Labs      06/20/18   0328  06/19/18   0324   WBC  7.4  8.1   HGB  9.1*  9.2*   HCT  28.7*  29.1*   PLT  168  172     Recent Labs      06/20/18   0328  06/19/18   0324   NA  141  140   K  3.5  3.7   CL  108*  106   CO2  28  28   GLU  182*  158*   BUN  9  19   CREA  0.73  0.85   CA  7.8*  7.9*   ALB  2.4*  2.4*   TBILI  0.2  0.3   ALT  65  51   SGOT  94*  109*     Recent Labs      06/19/18   0600   PH  7.41   PCO2  38   PO2  103   HCO3  23     No results for input(s): LCAD, LAC in the last 72 hours.       Assessment: (Medical Decision Making)     Hospital Problems  Never Reviewed          Codes Class Noted POA    JEFF (obstructive sleep apnea) likely ICD-10-CM: G47.33  ICD-9-CM: 327.23  6/18/2018 Unknown    With snoring, daytime hypersomnolence, obesity, and witnessed apneas. We are expediting PSG to be able to get her equipment as quickly as possible. ARF (acute renal failure) ([facility] ICD-10-CM: N17.9  ICD-9-CM: 584.9  6/17/2018 Yes    Resolved. Hypoglycemia ICD-10-CM: E16.2  ICD-9-CM: 251.2  6/17/2018 Yes        * (Principal)Unresponsive ICD-10-CM: R41.89  ICD-9-CM: 780.09  6/17/2018 Yes    resolved    Respiratory acidosis ICD-10-CM: E87.2  ICD-9-CM: 276.2  6/17/2018 Yes    resolved    Morbid obesity due to excess calories (HCC) (Chronic) ICD-10-CM: E66.01  ICD-9-CM: 278.01  6/17/2018 Yes        Sepsis ([facility] ICD-10-CM: A41.9  ICD-9-CM: 038.9, 995.91  4/1/2016 Yes        Uncontrolled type 2 diabetes mellitus, without long-term current use of insulin (HCC) (Chronic) ICD-10-CM: E11.65  ICD-9-CM: 250.02  4/1/2016 Yes              Plan:  (Medical Decision Making)     --We will work to expedite PSG asap. Please confirm this has been coordinated prior to discharge. --f/u TSH with PCP, likely needs retesting. More than 50% of the time documented was spent in face-to-face contact with the patient and in the care of the patient on the floor/unit where the patient is located.     Jeromy Sanabria MD

## 2018-06-21 NOTE — PROGRESS NOTES
Left message with [employer] to provide scheduled date for split night sleep study. Notes and order were faxed yesterday with urgent expedite request to schedule patient for in-lab study before the end of week. Will continue to follow-up today with [employer].

## 2018-06-24 ENCOUNTER — HOSPITAL ENCOUNTER (OUTPATIENT)
Dept: SLEEP MEDICINE | Age: 38
Discharge: HOME | End: 2018-06-24
Payer: MEDICARE

## 2018-06-24 PROCEDURE — 95810 POLYSOM 6/> YRS 4/> PARAM: CPT

## 2018-07-02 ENCOUNTER — HOSPITAL ENCOUNTER (OUTPATIENT)
Dept: SLEEP MEDICINE | Age: 38
Discharge: HOME | End: 2018-07-02
Payer: MEDICARE

## 2018-07-02 LAB
BACTERIA SPEC CULT: ABNORMAL
GRAM STN SPEC: ABNORMAL
SERVICE CMNT-IMP: ABNORMAL

## 2018-07-02 PROCEDURE — 95811 POLYSOM 6/>YRS CPAP 4/> PARM: CPT

## 2018-07-09 LAB
Lab: NORMAL
REFERENCE LAB,REFLB: NORMAL
TEST DESCRIPTION:,ATST: NORMAL

## 2018-07-10 PROBLEM — G47.31 CENTRAL SLEEP APNEA: Status: ACTIVE | Noted: 2018-07-10

## 2018-07-10 PROBLEM — R09.02 HYPOXEMIA: Status: ACTIVE | Noted: 2018-07-10

## 2018-07-10 PROBLEM — G47.61 PLMD (PERIODIC LIMB MOVEMENT DISORDER): Status: ACTIVE | Noted: 2018-07-10

## 2018-07-10 PROBLEM — G47.10 HYPERSOMNIA: Status: ACTIVE | Noted: 2018-07-10

## 2018-07-11 LAB
BACTERIA SPEC CULT: ABNORMAL
GRAM STN SPEC: ABNORMAL
SERVICE CMNT-IMP: ABNORMAL

## 2018-11-19 ENCOUNTER — HOSPITAL ENCOUNTER (EMERGENCY)
Age: 38
Discharge: HOME | End: 2018-11-19
Attending: EMERGENCY MEDICINE
Payer: MEDICARE

## 2018-11-19 ENCOUNTER — APPOINTMENT (OUTPATIENT)
Dept: CT IMAGING | Age: 38
End: 2018-11-19
Attending: EMERGENCY MEDICINE
Payer: MEDICARE

## 2018-11-19 ENCOUNTER — APPOINTMENT (OUTPATIENT)
Dept: ULTRASOUND IMAGING | Age: 38
End: 2018-11-19
Attending: EMERGENCY MEDICINE
Payer: MEDICARE

## 2018-11-19 VITALS
HEART RATE: 81 BPM | SYSTOLIC BLOOD PRESSURE: 154 MMHG | DIASTOLIC BLOOD PRESSURE: 67 MMHG | OXYGEN SATURATION: 98 % | TEMPERATURE: 97.7 F | BODY MASS INDEX: 43.4 KG/M2 | HEIGHT: 69 IN | WEIGHT: 293 LBS | RESPIRATION RATE: 16 BRPM

## 2018-11-19 DIAGNOSIS — N83.202 CYST OF LEFT OVARY: Primary | ICD-10-CM

## 2018-11-19 LAB
ALBUMIN SERPL-MCNC: 3.5 G/DL (ref 3.5–5)
ALBUMIN/GLOB SERPL: 0.8 {RATIO}
ALP SERPL-CCNC: 108 U/L (ref 50–130)
ALT SERPL-CCNC: 32 U/L (ref 12–65)
ANION GAP SERPL CALC-SCNC: 11 MMOL/L
AST SERPL-CCNC: 18 U/L (ref 15–37)
BASOPHILS # BLD: 0.1 K/UL (ref 0–0.2)
BASOPHILS NFR BLD: 1 % (ref 0–2)
BILIRUB SERPL-MCNC: 0.2 MG/DL (ref 0.2–1.1)
BUN SERPL-MCNC: 13 MG/DL (ref 6–23)
CALCIUM SERPL-MCNC: 8.9 MG/DL (ref 8.3–10.4)
CHLORIDE SERPL-SCNC: 101 MMOL/L (ref 98–107)
CO2 SERPL-SCNC: 28 MMOL/L (ref 21–32)
CREAT SERPL-MCNC: 0.71 MG/DL (ref 0.6–1)
DIFFERENTIAL METHOD BLD: ABNORMAL
EOSINOPHIL # BLD: 0.1 K/UL (ref 0–0.8)
EOSINOPHIL NFR BLD: 1 % (ref 0.5–7.8)
ERYTHROCYTE [DISTWIDTH] IN BLOOD BY AUTOMATED COUNT: 15.9 %
GLOBULIN SER CALC-MCNC: 4.5 G/DL (ref 2.3–3.5)
GLUCOSE SERPL-MCNC: 71 MG/DL (ref 65–100)
HCG UR QL: NEGATIVE
HCT VFR BLD AUTO: 38.9 % (ref 35.8–46.3)
HGB BLD-MCNC: 12.3 G/DL (ref 11.7–15.4)
IMM GRANULOCYTES # BLD: 0 K/UL (ref 0–0.5)
IMM GRANULOCYTES NFR BLD AUTO: 0 % (ref 0–5)
LIPASE SERPL-CCNC: 84 U/L (ref 73–393)
LYMPHOCYTES # BLD: 3.3 K/UL (ref 0.5–4.6)
LYMPHOCYTES NFR BLD: 37 % (ref 13–44)
MCH RBC QN AUTO: 24.7 PG (ref 26.1–32.9)
MCHC RBC AUTO-ENTMCNC: 31.6 G/DL (ref 31.4–35)
MCV RBC AUTO: 78.1 FL (ref 79.6–97.8)
MONOCYTES # BLD: 0.8 K/UL (ref 0.1–1.3)
MONOCYTES NFR BLD: 9 % (ref 4–12)
NEUTS SEG # BLD: 4.7 K/UL (ref 1.7–8.2)
NEUTS SEG NFR BLD: 52 % (ref 43–78)
NRBC # BLD: 0 K/UL (ref 0–0.2)
PLATELET # BLD AUTO: 260 K/UL (ref 150–450)
PMV BLD AUTO: 10.6 FL (ref 9.4–12.3)
POTASSIUM SERPL-SCNC: 3.4 MMOL/L (ref 3.5–5.1)
PROT SERPL-MCNC: 8 G/DL
RBC # BLD AUTO: 4.98 M/UL (ref 4.05–5.2)
SODIUM SERPL-SCNC: 140 MMOL/L (ref 136–145)
WBC # BLD AUTO: 9 K/UL (ref 4.3–11.1)

## 2018-11-19 PROCEDURE — 83690 ASSAY OF LIPASE: CPT

## 2018-11-19 PROCEDURE — 96374 THER/PROPH/DIAG INJ IV PUSH: CPT | Performed by: EMERGENCY MEDICINE

## 2018-11-19 PROCEDURE — 96376 TX/PRO/DX INJ SAME DRUG ADON: CPT | Performed by: EMERGENCY MEDICINE

## 2018-11-19 PROCEDURE — 81025 URINE PREGNANCY TEST: CPT

## 2018-11-19 PROCEDURE — 80053 COMPREHEN METABOLIC PANEL: CPT

## 2018-11-19 PROCEDURE — 74011250636 HC RX REV CODE- 250/636: Performed by: EMERGENCY MEDICINE

## 2018-11-19 PROCEDURE — 99284 EMERGENCY DEPT VISIT MOD MDM: CPT | Performed by: EMERGENCY MEDICINE

## 2018-11-19 PROCEDURE — 96375 TX/PRO/DX INJ NEW DRUG ADDON: CPT | Performed by: EMERGENCY MEDICINE

## 2018-11-19 PROCEDURE — 74177 CT ABD & PELVIS W/CONTRAST: CPT

## 2018-11-19 PROCEDURE — 81003 URINALYSIS AUTO W/O SCOPE: CPT | Performed by: EMERGENCY MEDICINE

## 2018-11-19 PROCEDURE — 74011000258 HC RX REV CODE- 258: Performed by: EMERGENCY MEDICINE

## 2018-11-19 PROCEDURE — 85025 COMPLETE CBC W/AUTO DIFF WBC: CPT

## 2018-11-19 PROCEDURE — 76830 TRANSVAGINAL US NON-OB: CPT

## 2018-11-19 PROCEDURE — 74011636320 HC RX REV CODE- 636/320: Performed by: EMERGENCY MEDICINE

## 2018-11-19 RX ORDER — LEVOTHYROXINE SODIUM 112 UG/1
TABLET ORAL
COMMUNITY
Start: 2018-10-31

## 2018-11-19 RX ORDER — MORPHINE SULFATE 30 MG/1
TABLET, FILM COATED, EXTENDED RELEASE ORAL
Refills: 0 | COMMUNITY
Start: 2018-09-28 | End: 2019-03-21

## 2018-11-19 RX ORDER — LOSARTAN POTASSIUM 50 MG/1
TABLET ORAL
Refills: 5 | COMMUNITY
Start: 2018-10-06 | End: 2019-10-04

## 2018-11-19 RX ORDER — LIDOCAINE 50 MG/G
OINTMENT TOPICAL
COMMUNITY
Start: 2018-10-08

## 2018-11-19 RX ORDER — ONDANSETRON 2 MG/ML
4 INJECTION INTRAMUSCULAR; INTRAVENOUS
Status: COMPLETED | OUTPATIENT
Start: 2018-11-19 | End: 2018-11-19

## 2018-11-19 RX ORDER — MORPHINE SULFATE 4 MG/ML
4 INJECTION INTRAVENOUS
Status: COMPLETED | OUTPATIENT
Start: 2018-11-19 | End: 2018-11-19

## 2018-11-19 RX ORDER — AMLODIPINE BESYLATE 5 MG/1
TABLET ORAL
Refills: 5 | COMMUNITY
Start: 2018-10-06

## 2018-11-19 RX ORDER — PROMETHAZINE HYDROCHLORIDE 25 MG/1
TABLET ORAL
COMMUNITY
End: 2019-10-04

## 2018-11-19 RX ORDER — LANCETS
EACH MISCELLANEOUS
COMMUNITY
Start: 2018-10-09 | End: 2019-10-04

## 2018-11-19 RX ORDER — INSULIN PUMP SYRINGE, 3 ML
EACH MISCELLANEOUS
COMMUNITY
Start: 2017-06-19

## 2018-11-19 RX ORDER — HYDROMORPHONE HYDROCHLORIDE 2 MG/ML
1 INJECTION, SOLUTION INTRAMUSCULAR; INTRAVENOUS; SUBCUTANEOUS ONCE
Status: COMPLETED | OUTPATIENT
Start: 2018-11-19 | End: 2018-11-19

## 2018-11-19 RX ORDER — DICLOFENAC SODIUM 10 MG/G
GEL TOPICAL
COMMUNITY
Start: 2018-06-05 | End: 2018-12-02

## 2018-11-19 RX ORDER — HYDROXYZINE PAMOATE 25 MG/1
25 CAPSULE ORAL
COMMUNITY
Start: 2017-10-09 | End: 2019-10-04

## 2018-11-19 RX ORDER — KETOROLAC TROMETHAMINE 10 MG/1
10 TABLET, FILM COATED ORAL EVERY 6 HOURS
Qty: 20 TAB | Refills: 0 | Status: SHIPPED | OUTPATIENT
Start: 2018-11-19 | End: 2018-11-24

## 2018-11-19 RX ORDER — SODIUM CHLORIDE 0.9 % (FLUSH) 0.9 %
10 SYRINGE (ML) INJECTION
Status: COMPLETED | OUTPATIENT
Start: 2018-11-19 | End: 2018-11-19

## 2018-11-19 RX ORDER — LANCETS 30 GAUGE
EACH MISCELLANEOUS
Refills: 11 | COMMUNITY
Start: 2018-10-09 | End: 2019-10-04

## 2018-11-19 RX ORDER — ZOLPIDEM TARTRATE 10 MG/1
TABLET ORAL
COMMUNITY
Start: 2018-02-28

## 2018-11-19 RX ORDER — MODAFINIL 200 MG/1
200 TABLET ORAL
COMMUNITY
End: 2019-03-21

## 2018-11-19 RX ORDER — NAPROXEN SODIUM 550 MG/1
TABLET ORAL
COMMUNITY
Start: 2015-04-28 | End: 2019-03-21

## 2018-11-19 RX ORDER — DULOXETIN HYDROCHLORIDE 60 MG/1
CAPSULE, DELAYED RELEASE ORAL
COMMUNITY
Start: 2018-11-17

## 2018-11-19 RX ORDER — KETOROLAC TROMETHAMINE 30 MG/ML
30 INJECTION, SOLUTION INTRAMUSCULAR; INTRAVENOUS
Status: COMPLETED | OUTPATIENT
Start: 2018-11-19 | End: 2018-11-19

## 2018-11-19 RX ORDER — PEN NEEDLE, DIABETIC 32GX 5/32"
NEEDLE, DISPOSABLE MISCELLANEOUS
Refills: 3 | COMMUNITY
Start: 2018-09-20 | End: 2019-10-04

## 2018-11-19 RX ORDER — INSULIN GLARGINE 300 U/ML
INJECTION, SOLUTION SUBCUTANEOUS
Refills: 11 | COMMUNITY
Start: 2018-10-11 | End: 2019-10-04

## 2018-11-19 RX ADMIN — SODIUM CHLORIDE 1000 ML: 900 INJECTION, SOLUTION INTRAVENOUS at 11:20

## 2018-11-19 RX ADMIN — HYDROMORPHONE HYDROCHLORIDE 1 MG: 2 INJECTION, SOLUTION INTRAMUSCULAR; INTRAVENOUS; SUBCUTANEOUS at 13:05

## 2018-11-19 RX ADMIN — SODIUM CHLORIDE 100 ML: 900 INJECTION, SOLUTION INTRAVENOUS at 11:47

## 2018-11-19 RX ADMIN — ONDANSETRON 4 MG: 2 INJECTION INTRAMUSCULAR; INTRAVENOUS at 11:20

## 2018-11-19 RX ADMIN — KETOROLAC TROMETHAMINE 30 MG: 30 INJECTION, SOLUTION INTRAMUSCULAR at 13:05

## 2018-11-19 RX ADMIN — HYDROMORPHONE HYDROCHLORIDE 1 MG: 2 INJECTION, SOLUTION INTRAMUSCULAR; INTRAVENOUS; SUBCUTANEOUS at 16:20

## 2018-11-19 RX ADMIN — MORPHINE SULFATE 4 MG: 4 INJECTION INTRAVENOUS at 11:20

## 2018-11-19 RX ADMIN — HYDROMORPHONE HYDROCHLORIDE 1 MG: 2 INJECTION, SOLUTION INTRAMUSCULAR; INTRAVENOUS; SUBCUTANEOUS at 12:03

## 2018-11-19 RX ADMIN — Medication 10 ML: at 11:47

## 2018-11-19 RX ADMIN — IOPAMIDOL 100 ML: 755 INJECTION, SOLUTION INTRAVENOUS at 11:47

## 2018-11-19 NOTE — ED NOTES
Informed that patient pain level rated as \"4/10, but starting to get worse again. \" Patient is in [address] [facility] [facility].  Waiting to see results of US in attempt to switch route of meds.

## 2018-11-19 NOTE — ED TRIAGE NOTES
Pt here with spouse and they state pt is having severe lower left quadrant pain. Pt states she feels like she has gas but is unable to pass gas even though she is having bowel movements. Pt states the pain has made her vomit and she is able to belch but unable to pass gas. Pt spouse states that he gave patient a saline enema and that did not help at all.

## 2018-11-19 NOTE — ED NOTES
I have reviewed discharge instructions with the patient and spouse. The patient and spouse verbalized understanding. Patient left ED via Discharge Method: ambulatory to Home with self and spouse. Opportunity for questions and clarification provided. Patient given 1 scripts. To continue your aftercare when you leave the hospital, you may receive an automated call from our care team to check in on how you are doing. This is a free service and part of our promise to provide the best care and service to meet your aftercare needs.  If you have questions, or wish to unsubscribe from this service please call [phone]. Thank you for Choosing our [facility] Emergency Department.

## 2018-11-19 NOTE — DISCHARGE INSTRUCTIONS
Functional Ovarian Cyst: Care Instructions  Your Care Instructions    A functional ovarian cyst is a sac that forms on the surface of a woman's ovary during ovulation. The sac holds a maturing egg. Usually the sac goes away after the egg is released. But if the egg is not released, or if the sac closes up after the egg is released, the sac can swell up with fluid and form a cyst.  Functional ovarian cysts are different than ovarian growths caused by other problems, such as cancer. Most functional ovarian cysts cause no symptoms and go away on their own. Some cause mild pain. Others can cause severe pain when they rupture or bleed. Follow-up care is a key part of your treatment and safety. Be sure to make and go to all appointments, and call your doctor if you are having problems. It's also a good idea to know your test results and keep a list of the medicines you take. How can you care for yourself at home? · Use heat, such as a hot water bottle, a heating pad set on low, or a warm bath, to relax tense muscles and relieve cramping. · Be safe with medicines. Take pain medicines exactly as directed. ? If the doctor gave you a prescription medicine for pain, take it as prescribed. ? If you are not taking a prescription pain medicine, ask your doctor if you can take an over-the-counter medicine. · Avoid constipation. Make sure you drink enough fluids and include fruits, vegetables, and fiber in your diet each day. Constipation does not cause ovarian cysts, but it may make your pelvic pain worse. When should you call for help? Call your doctor now or seek immediate medical care if:    · You have severe vaginal bleeding.     · You have new or worse belly or pelvic pain.    Watch closely for changes in your health, and be sure to contact your doctor if:    · You have unusual vaginal bleeding.     · You do not get better as expected. Where can you learn more?   Go to http://mayte-nina.info/. Enter E987 in the search box to learn more about \"Functional Ovarian Cyst: Care Instructions. \"  Current as of: May 15, 2018  Content Version: 11.8  © 6462-1336 [employer]wise, [employer]. Care instructions adapted under license by [employer] (which disclaims liability or warranty for this information). If you have questions about a medical condition or this instruction, always ask your healthcare professional. [place] [address] any warranty or liability for your use of this information.

## 2018-11-19 NOTE — ED PROVIDER NOTES
Patient presents to ER complaining of abdominal pain. States for the past 2 days she's had lower abdominal pain, particularly on the left side. She describes pain as a \"gas pain\". Reports she feels as if she needs to pass gas or have a bowel movement. 4 she's not been able to do any of this. Does report some nausea and vomiting. Denies any fevers or chills. She denies any urinary symptoms. The history is provided by the patient. Abdominal Pain This is a new problem. The current episode started more than 2 days ago. The problem has not changed since onset. The pain is located in the LLQ. The quality of the pain is aching and cramping. The pain is at a severity of 5/10. The pain is moderate. Associated symptoms include nausea, vomiting and constipation. Pertinent negatives include no fever, no diarrhea, no melena, no frequency and no back pain. The pain is relieved by nothing. Past Medical History:  
Diagnosis Date  Arthritis  Chronic pain   
 back  Diabetes ([facility] [address] Type 2, av fastin glucose 214, can tell when glucose lower than50  Endocrine disease  Gastrointestinal disorder   
 reflux  GERD (gastroesophageal reflux disease)  Infectious disease MRSA  JEFF (obstructive sleep apnea) likely 6/18/2018  Other ill-defined conditions(799.89) ruptured disc, thyroid, platelet disorder  Psychiatric disorder  Thyroid disease   
 hypothyroidism Past Surgical History:  
Procedure Laterality Date  HX GYN    
 ovarian cyst drained  HX ORTHOPAEDIC  2007, 2015  
 back x 2 Family History:  
Problem Relation Age of Onset  Diabetes Mother  Cancer Father  Hypertension Father  Diabetes Father Social History Socioeconomic History  Marital status:  Spouse name: Not on file  Number of children: Not on file  Years of education: Not on file  Highest education level: Not on file Social Needs  Financial resource strain: Not on file  Food insecurity - worry: Not on file  Food insecurity - inability: Not on file  Transportation needs - medical: Not on file  Transportation needs - non-medical: Not on file Occupational History  Not on file Tobacco Use  Smoking status: Never Smoker  Smokeless tobacco: Never Used Substance and Sexual Activity  Alcohol use: No  
 Drug use: No  
 Sexual activity: No  
Other Topics Concern  Not on file Social History Narrative  Not on file ALLERGIES: Codeine Review of Systems Constitutional: Negative for chills, diaphoresis and fever. HENT: Negative for congestion, dental problem, tinnitus and voice change. Eyes: Negative for photophobia and visual disturbance. Respiratory: Negative for chest tightness and shortness of breath. Cardiovascular: Negative for palpitations and leg swelling. Gastrointestinal: Positive for abdominal pain, constipation, nausea and vomiting. Negative for diarrhea and melena. Endocrine: Negative for polydipsia, polyphagia and polyuria. Genitourinary: Negative for flank pain, frequency and urgency. Musculoskeletal: Negative for back pain and gait problem. Skin: Negative for pallor and rash. Allergic/Immunologic: Negative for food allergies and immunocompromised state. Neurological: Negative for syncope and speech difficulty. Hematological: Negative for adenopathy. Does not bruise/bleed easily. Psychiatric/Behavioral: Negative for behavioral problems and confusion. All other systems reviewed and are negative. Vitals:  
 11/19/18 1029 BP: 125/85 Pulse: (!) 108 Resp: 22 Temp: 97.7 °F (36.5 °C) SpO2: 99% Weight: (!) 181.3 kg (399 lb 12 oz) Height: 5' 9\" (1.753 m) Physical Exam  
Constitutional: She appears well-developed and well-nourished. HENT:  
Head: Normocephalic and atraumatic.   
Mouth/Throat: Oropharynx is clear and moist.  
 Eyes: EOM are normal. Pupils are equal, round, and reactive to light. Cardiovascular: Normal rate and regular rhythm. Pulmonary/Chest: Effort normal and breath sounds normal.  
Abdominal: Normal appearance, normal aorta and bowel sounds are normal. She exhibits no distension and no ascites. There is tenderness in the left lower quadrant. There is no rigidity and no CVA tenderness. Nursing note and vitals reviewed. MDM Number of Diagnoses or Management Options Diagnosis management comments: Differential diagnoses includes colitis, diverticulitis, pyelonephritis 12:40 PM 
CT scan of abdomen and pelvis showed a large cystic lesion in the pelvis measuring 13 x 8 x 11 cm Will send for pelvic ultrasound 3:47 PM 
Followed up on US and discussed results with Dr. Tahira Keane with GYN. She will follow up with pt in one week. Amount and/or Complexity of Data Reviewed Clinical lab tests: ordered and reviewed Tests in the radiology section of CPT®: ordered and reviewed Risk of Complications, Morbidity, and/or Mortality Presenting problems: moderate Diagnostic procedures: low Management options: low Patient Progress Patient progress: stable Procedures

## 2022-03-18 PROBLEM — R09.02 HYPOXEMIA: Status: ACTIVE | Noted: 2018-07-10

## 2022-03-19 PROBLEM — N17.9 ARF (ACUTE RENAL FAILURE) (HCC): Status: ACTIVE | Noted: 2018-06-17

## 2022-03-19 PROBLEM — E87.29 RESPIRATORY ACIDOSIS: Status: ACTIVE | Noted: 2018-06-17

## 2022-03-19 PROBLEM — G47.31 CENTRAL SLEEP APNEA: Status: ACTIVE | Noted: 2018-07-10

## 2022-03-19 PROBLEM — E66.01 MORBID OBESITY DUE TO EXCESS CALORIES (HCC): Status: ACTIVE | Noted: 2018-06-17

## 2022-03-19 PROBLEM — G47.10 HYPERSOMNIA: Status: ACTIVE | Noted: 2018-07-10

## 2022-03-19 PROBLEM — R41.89 UNRESPONSIVE: Status: ACTIVE | Noted: 2018-06-17

## 2022-03-19 PROBLEM — G47.33 OSA (OBSTRUCTIVE SLEEP APNEA): Status: ACTIVE | Noted: 2018-06-18

## 2022-03-19 PROBLEM — E16.2 HYPOGLYCEMIA: Status: ACTIVE | Noted: 2018-06-17

## 2022-03-19 PROBLEM — G47.61 PLMD (PERIODIC LIMB MOVEMENT DISORDER): Status: ACTIVE | Noted: 2018-07-10

## 2022-06-13 ENCOUNTER — APPOINTMENT (OUTPATIENT)
Dept: GENERAL RADIOLOGY | Age: 42
End: 2022-06-13
Payer: MEDICARE

## 2022-06-13 ENCOUNTER — HOSPITAL ENCOUNTER (EMERGENCY)
Age: 42
Discharge: HOME | End: 2022-06-13
Attending: EMERGENCY MEDICINE
Payer: MEDICARE

## 2022-06-13 VITALS
DIASTOLIC BLOOD PRESSURE: 80 MMHG | OXYGEN SATURATION: 100 % | RESPIRATION RATE: 16 BRPM | BODY MASS INDEX: 32.58 KG/M2 | SYSTOLIC BLOOD PRESSURE: 149 MMHG | HEART RATE: 95 BPM | TEMPERATURE: 97.9 F | HEIGHT: 69 IN | WEIGHT: 220 LBS
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
## 2022-06-13 DIAGNOSIS — R07.9 CHEST PAIN, UNSPECIFIED TYPE: ICD-10-CM

## 2022-06-13 PROCEDURE — 71046 X-RAY EXAM CHEST 2 VIEWS: CPT

## 2022-06-13 PROCEDURE — 99283 EMERGENCY DEPT VISIT LOW MDM: CPT

## 2022-06-13 RX ORDER — CYCLOBENZAPRINE HCL 5 MG
5 TABLET ORAL 2 TIMES DAILY PRN
Qty: 10 TABLET | Refills: 0 | Status: SHIPPED | OUTPATIENT
Start: 2022-06-13 | End: 2022-06-23

## 2022-06-13 RX ORDER — MELOXICAM 7.5 MG/1
7.5 TABLET ORAL DAILY
Qty: 30 TABLET | Refills: 1 | Status: SHIPPED | OUTPATIENT
Start: 2022-06-13

## 2022-06-13 ASSESSMENT — PAIN SCALES - GENERAL: PAINLEVEL_OUTOF10: 7

## 2022-06-13 ASSESSMENT — ENCOUNTER SYMPTOMS
VOMITING: 0
NAUSEA: 0
COUGH: 0

## 2022-06-13 NOTE — ED NOTES
I have reviewed discharge instructions with the patient. The patient verbalized understanding. Patient left ED via Discharge Method: ambulatory to Home with (insert name of family/friend, self, transport family). Opportunity for questions and clarification provided. Patient given 2 scripts. To continue your aftercare when you leave the hospital, you may receive an automated call from our care team to check in on how you are doing.  This is a free service and part of our promise to provide the best care and service to meet your aftercare needs. \" If you have questions, or wish to unsubscribe from this service please call [phone].  Thank you for Choosing our [facility] Emergency Department.         Sudeep José RN  06/13/22 2840
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
## 2023-07-03 ENCOUNTER — APPOINTMENT (OUTPATIENT)
Dept: GENERAL RADIOLOGY | Age: 43
End: 2023-07-03
Payer: MEDICARE

## 2023-07-03 LAB
ANION GAP SERPL CALC-SCNC: 4 MMOL/L (ref 2–11)
BASOPHILS # BLD: 0 K/UL (ref 0–0.2)
BASOPHILS NFR BLD: 0 % (ref 0–2)
BUN SERPL-MCNC: 14 MG/DL (ref 6–23)
CALCIUM SERPL-MCNC: 8.3 MG/DL (ref 8.3–10.4)
CHLORIDE SERPL-SCNC: 110 MMOL/L (ref 101–110)
CO2 SERPL-SCNC: 30 MMOL/L (ref 21–32)
CREAT SERPL-MCNC: 0.57 MG/DL (ref 0.6–1)
DIFFERENTIAL METHOD BLD: NORMAL
EOSINOPHIL # BLD: 0.1 K/UL (ref 0–0.8)
EOSINOPHIL NFR BLD: 2 % (ref 0.5–7.8)
ERYTHROCYTE [DISTWIDTH] IN BLOOD BY AUTOMATED COUNT: 14.1 % (ref 11.9–14.6)
GLUCOSE SERPL-MCNC: 98 MG/DL (ref 65–100)
HCT VFR BLD AUTO: 37.2 % (ref 35.8–46.3)
HGB BLD-MCNC: 12.2 G/DL (ref 11.7–15.4)
IMM GRANULOCYTES # BLD AUTO: 0 K/UL (ref 0–0.5)
IMM GRANULOCYTES NFR BLD AUTO: 0 % (ref 0–5)
LYMPHOCYTES # BLD: 1.9 K/UL (ref 0.5–4.6)
LYMPHOCYTES NFR BLD: 28 % (ref 13–44)
MCH RBC QN AUTO: 27.8 PG (ref 26.1–32.9)
MCHC RBC AUTO-ENTMCNC: 32.8 G/DL (ref 31.4–35)
MCV RBC AUTO: 84.7 FL (ref 82–102)
MONOCYTES # BLD: 0.5 K/UL (ref 0.1–1.3)
MONOCYTES NFR BLD: 7 % (ref 4–12)
NEUTS SEG # BLD: 4.2 K/UL (ref 1.7–8.2)
NEUTS SEG NFR BLD: 62 % (ref 43–78)
NRBC # BLD: 0 K/UL (ref 0–0.2)
PLATELET # BLD AUTO: 318 K/UL (ref 150–450)
PMV BLD AUTO: 10.9 FL (ref 9.4–12.3)
POTASSIUM SERPL-SCNC: 4.1 MMOL/L (ref 3.5–5.1)
RBC # BLD AUTO: 4.39 M/UL (ref 4.05–5.2)
SODIUM SERPL-SCNC: 144 MMOL/L (ref 133–143)
TROPONIN I SERPL HS-MCNC: 3.3 PG/ML (ref 0–14)
WBC # BLD AUTO: 6.8 K/UL (ref 4.3–11.1)

## 2023-07-03 PROCEDURE — 83690 ASSAY OF LIPASE: CPT

## 2023-07-03 PROCEDURE — 96372 THER/PROPH/DIAG INJ SC/IM: CPT

## 2023-07-03 PROCEDURE — 99285 EMERGENCY DEPT VISIT HI MDM: CPT

## 2023-07-03 PROCEDURE — 84484 ASSAY OF TROPONIN QUANT: CPT

## 2023-07-03 PROCEDURE — 80076 HEPATIC FUNCTION PANEL: CPT

## 2023-07-03 PROCEDURE — 80048 BASIC METABOLIC PNL TOTAL CA: CPT

## 2023-07-03 PROCEDURE — 93005 ELECTROCARDIOGRAM TRACING: CPT | Performed by: EMERGENCY MEDICINE

## 2023-07-03 PROCEDURE — 71046 X-RAY EXAM CHEST 2 VIEWS: CPT

## 2023-07-03 PROCEDURE — 85025 COMPLETE CBC W/AUTO DIFF WBC: CPT

## 2023-07-03 ASSESSMENT — PAIN - FUNCTIONAL ASSESSMENT
PAIN_FUNCTIONAL_ASSESSMENT: 0-10
PAIN_FUNCTIONAL_ASSESSMENT: 0-10

## 2023-07-03 ASSESSMENT — LIFESTYLE VARIABLES
HOW OFTEN DO YOU HAVE A DRINK CONTAINING ALCOHOL: NEVER
HOW MANY STANDARD DRINKS CONTAINING ALCOHOL DO YOU HAVE ON A TYPICAL DAY: PATIENT DOES NOT DRINK

## 2023-07-03 ASSESSMENT — PAIN SCALES - GENERAL
PAINLEVEL_OUTOF10: 5
PAINLEVEL_OUTOF10: 5

## 2023-07-04 ENCOUNTER — HOSPITAL ENCOUNTER (EMERGENCY)
Age: 43
Discharge: HOME | End: 2023-07-04
Attending: EMERGENCY MEDICINE
Payer: MEDICARE

## 2023-07-04 ENCOUNTER — APPOINTMENT (OUTPATIENT)
Dept: ULTRASOUND IMAGING | Age: 43
End: 2023-07-04
Payer: MEDICARE

## 2023-07-04 VITALS
BODY MASS INDEX: 25.48 KG/M2 | HEART RATE: 78 BPM | HEIGHT: 69 IN | DIASTOLIC BLOOD PRESSURE: 64 MMHG | OXYGEN SATURATION: 99 % | SYSTOLIC BLOOD PRESSURE: 118 MMHG | TEMPERATURE: 99 F | RESPIRATION RATE: 20 BRPM | WEIGHT: 172 LBS

## 2023-07-04 DIAGNOSIS — R07.89 CHEST WALL PAIN: Primary | ICD-10-CM

## 2023-07-04 DIAGNOSIS — N30.00 ACUTE CYSTITIS WITHOUT HEMATURIA: ICD-10-CM

## 2023-07-04 DIAGNOSIS — R10.10 UPPER ABDOMINAL PAIN: ICD-10-CM

## 2023-07-04 LAB
ALBUMIN SERPL-MCNC: 3 G/DL (ref 3.5–5)
ALBUMIN/GLOB SERPL: 0.8 (ref 0.4–1.6)
ALP SERPL-CCNC: 82 U/L (ref 50–136)
ALT SERPL-CCNC: 41 U/L (ref 12–65)
APPEARANCE UR: ABNORMAL
AST SERPL-CCNC: 46 U/L (ref 15–37)
BACTERIA URNS QL MICRO: ABNORMAL /HPF
BILIRUB DIRECT SERPL-MCNC: 0.1 MG/DL
BILIRUB SERPL-MCNC: 0.2 MG/DL (ref 0.2–1.1)
BILIRUB UR QL: NEGATIVE
COLOR UR: ABNORMAL
EKG ATRIAL RATE: 80 BPM
EKG DIAGNOSIS: NORMAL
EKG P AXIS: 68 DEGREES
EKG P-R INTERVAL: 120 MS
EKG Q-T INTERVAL: 394 MS
EKG QRS DURATION: 80 MS
EKG QTC CALCULATION (BAZETT): 454 MS
EKG R AXIS: 62 DEGREES
EKG T AXIS: 53 DEGREES
EKG VENTRICULAR RATE: 80 BPM
EPI CELLS #/AREA URNS HPF: ABNORMAL /HPF
GLOBULIN SER CALC-MCNC: 3.7 G/DL (ref 2.8–4.5)
GLUCOSE UR STRIP.AUTO-MCNC: NEGATIVE MG/DL
HGB UR QL STRIP: ABNORMAL
KETONES UR QL STRIP.AUTO: ABNORMAL MG/DL
LEUKOCYTE ESTERASE UR QL STRIP.AUTO: ABNORMAL
LIPASE SERPL-CCNC: 90 U/L (ref 73–393)
NITRITE UR QL STRIP.AUTO: POSITIVE
OTHER OBSERVATIONS: ABNORMAL
PH UR STRIP: 6.5 (ref 5–9)
PROT SERPL-MCNC: 6.7 G/DL (ref 6.3–8.2)
PROT UR STRIP-MCNC: 30 MG/DL
RBC #/AREA URNS HPF: ABNORMAL /HPF
SP GR UR REFRACTOMETRY: 1.02 (ref 1–1.02)
TROPONIN I SERPL HS-MCNC: 3.3 PG/ML (ref 0–14)
UROBILINOGEN UR QL STRIP.AUTO: 1 EU/DL (ref 0.2–1)
WBC URNS QL MICRO: ABNORMAL /HPF

## 2023-07-04 PROCEDURE — 96372 THER/PROPH/DIAG INJ SC/IM: CPT

## 2023-07-04 PROCEDURE — 81001 URINALYSIS AUTO W/SCOPE: CPT

## 2023-07-04 PROCEDURE — 93010 ELECTROCARDIOGRAM REPORT: CPT | Performed by: INTERNAL MEDICINE

## 2023-07-04 PROCEDURE — 6370000000 HC RX 637 (ALT 250 FOR IP): Performed by: EMERGENCY MEDICINE

## 2023-07-04 PROCEDURE — 6360000002 HC RX W HCPCS: Performed by: EMERGENCY MEDICINE

## 2023-07-04 PROCEDURE — 76705 ECHO EXAM OF ABDOMEN: CPT

## 2023-07-04 PROCEDURE — 84484 ASSAY OF TROPONIN QUANT: CPT

## 2023-07-04 RX ORDER — NITROFURANTOIN 25; 75 MG/1; MG/1
100 CAPSULE ORAL 2 TIMES DAILY
Qty: 14 CAPSULE | Refills: 0 | Status: SHIPPED | OUTPATIENT
Start: 2023-07-04 | End: 2023-07-11

## 2023-07-04 RX ORDER — ACETAMINOPHEN 325 MG/1
650 TABLET ORAL
Status: COMPLETED | OUTPATIENT
Start: 2023-07-04 | End: 2023-07-04

## 2023-07-04 RX ORDER — PANTOPRAZOLE SODIUM 40 MG/1
40 TABLET, DELAYED RELEASE ORAL
Qty: 30 TABLET | Refills: 1 | Status: SHIPPED | OUTPATIENT
Start: 2023-07-04

## 2023-07-04 RX ORDER — FAMOTIDINE 20 MG/1
20 TABLET, FILM COATED ORAL 2 TIMES DAILY
Qty: 28 TABLET | Refills: 0 | Status: SHIPPED | OUTPATIENT
Start: 2023-07-04 | End: 2023-07-18

## 2023-07-04 RX ADMIN — HYDROMORPHONE HYDROCHLORIDE 0.5 MG: 1 INJECTION, SOLUTION INTRAMUSCULAR; INTRAVENOUS; SUBCUTANEOUS at 04:18

## 2023-07-04 RX ADMIN — ACETAMINOPHEN 650 MG: 325 TABLET, FILM COATED ORAL at 06:22

## 2023-07-04 ASSESSMENT — ENCOUNTER SYMPTOMS
BACK PAIN: 0
VOMITING: 0
DIARRHEA: 0
ABDOMINAL PAIN: 1
SHORTNESS OF BREATH: 0
COUGH: 0
COLOR CHANGE: 0
NAUSEA: 0
RHINORRHEA: 0

## 2023-07-04 ASSESSMENT — PAIN SCALES - GENERAL
PAINLEVEL_OUTOF10: 6
PAINLEVEL_OUTOF10: 0

## 2023-07-04 ASSESSMENT — PAIN DESCRIPTION - LOCATION: LOCATION: HEAD

## 2023-07-04 NOTE — ED PROVIDER NOTES
5 % CREA    Apply topically 2 times daily as needed    MODAFINIL (PROVIGIL) 200 MG TABLET    Take 200 mg by mouth 2 times daily. ONDANSETRON (ZOFRAN-ODT) 4 MG DISINTEGRATING TABLET    Take 4 mg by mouth every 8 hours as needed    PREGABALIN (LYRICA) 150 MG CAPSULE    Take 150 mg by mouth 3 times daily. TIZANIDINE (ZANAFLEX) 4 MG TABLET    Take 4 mg by mouth 3 times daily as needed    ZOLPIDEM (AMBIEN) 10 MG TABLET    TAKE 1 TABLET(10 MG) BY MOUTH EVERY NIGHT        Results for orders placed or performed during the hospital encounter of 07/04/23   XR CHEST (2 VW)    Narrative    EXAMINATION: PA and lateral chest.    DATE: 7/3/2023. COMPARISON: 6/13/2022. CLINICAL HISTORY: Chest pain. FINDINGS:    The lungs and pleural spaces are clear. The cardiomediastinal silhouette and the pulmonary vessels are within normal   limits. Impression    No acute cardiopulmonary process.      Graciela Weller D.O.   7/4/2023 12:26:00 AM   Basic Metabolic Panel   Result Value Ref Range    Sodium 144 (H) 133 - 143 mmol/L    Potassium 4.1 3.5 - 5.1 mmol/L    Chloride 110 101 - 110 mmol/L    CO2 30 21 - 32 mmol/L    Anion Gap 4 2 - 11 mmol/L    Glucose 98 65 - 100 mg/dL    BUN 14 6 - 23 MG/DL    Creatinine 0.57 (L) 0.6 - 1.0 MG/DL    Est, Glom Filt Rate >60 >60 ml/min/1.73m2    Calcium 8.3 8.3 - 10.4 MG/DL   CBC with Auto Differential   Result Value Ref Range    WBC 6.8 4.3 - 11.1 K/uL    RBC 4.39 4.05 - 5.2 M/uL    Hemoglobin 12.2 11.7 - 15.4 g/dL    Hematocrit 37.2 35.8 - 46.3 %    MCV 84.7 82.0 - 102.0 FL    MCH 27.8 26.1 - 32.9 PG    MCHC 32.8 31.4 - 35.0 g/dL    RDW 14.1 11.9 - 14.6 %    Platelets 136 771 - 344 K/uL    MPV 10.9 9.4 - 12.3 FL    nRBC 0.00 0.0 - 0.2 K/uL    Differential Type AUTOMATED      Neutrophils % 62 43 - 78 %    Lymphocytes % 28 13 - 44 %    Monocytes % 7 4.0 - 12.0 %    Eosinophils % 2 0.5 - 7.8 %    Basophils % 0 0.0 - 2.0 %    Immature Granulocytes 0 0.0 - 5.0 %    Neutrophils Absolute 4.2

## 2023-07-04 NOTE — ED TRIAGE NOTES
Pt reports feeling weak. HX of anemia. Pt reports having chest pain that radiates into her middle back.  in triage and states she has been really weak. Pt and  concerned.

## 2023-07-04 NOTE — DISCHARGE INSTRUCTIONS
Stop omeprazole and take Protonix 40 mg daily instead. Take Pepcid 20 mg twice daily for 2 weeks. If you are currently taking ibuprofen, meloxicam or diclofenac then stop them. Avoid all alcohol. Follow-up with your primary care doctor in 1 to 2 weeks if not improving. Return here if any new, worsening or concerning symptoms.

## 2024-03-04 ENCOUNTER — APPOINTMENT (OUTPATIENT)
Dept: CT IMAGING | Age: 44
End: 2024-03-04
Payer: MEDICARE

## 2024-03-04 ENCOUNTER — HOSPITAL ENCOUNTER (EMERGENCY)
Age: 44
Discharge: HOME | End: 2024-03-04
Payer: MEDICARE

## 2024-03-04 VITALS
TEMPERATURE: 98.5 F | WEIGHT: 172 LBS | HEART RATE: 92 BPM | OXYGEN SATURATION: 97 % | DIASTOLIC BLOOD PRESSURE: 68 MMHG | BODY MASS INDEX: 25.48 KG/M2 | SYSTOLIC BLOOD PRESSURE: 120 MMHG | RESPIRATION RATE: 12 BRPM | HEIGHT: 69 IN

## 2024-03-04 DIAGNOSIS — D64.9 ANEMIA, UNSPECIFIED TYPE: ICD-10-CM

## 2024-03-04 DIAGNOSIS — S09.90XA CLOSED HEAD INJURY, INITIAL ENCOUNTER: Primary | ICD-10-CM

## 2024-03-04 LAB
ALBUMIN SERPL-MCNC: 3.8 G/DL (ref 3.5–5)
ALBUMIN/GLOB SERPL: 1.2 (ref 0.4–1.6)
ALP SERPL-CCNC: 78 U/L (ref 50–130)
ALT SERPL-CCNC: 41 U/L (ref 12–65)
ANION GAP SERPL CALC-SCNC: 5 MMOL/L (ref 2–11)
APPEARANCE UR: CLEAR
AST SERPL-CCNC: 32 U/L (ref 15–37)
BASOPHILS # BLD: 0.1 K/UL (ref 0–0.2)
BASOPHILS NFR BLD: 1 % (ref 0–2)
BILIRUB SERPL-MCNC: 0.2 MG/DL (ref 0.2–1.1)
BILIRUB UR QL: NEGATIVE
BUN SERPL-MCNC: 8 MG/DL (ref 6–23)
CALCIUM SERPL-MCNC: 8.7 MG/DL (ref 8.3–10.4)
CHLORIDE SERPL-SCNC: 106 MMOL/L (ref 103–113)
CO2 SERPL-SCNC: 29 MMOL/L (ref 21–32)
COLOR UR: NORMAL
CREAT SERPL-MCNC: 0.7 MG/DL (ref 0.6–1)
CRP SERPL-MCNC: <0.3 MG/DL (ref 0–0.9)
DIFFERENTIAL METHOD BLD: ABNORMAL
EOSINOPHIL # BLD: 0 K/UL (ref 0–0.8)
EOSINOPHIL NFR BLD: 0 % (ref 0.5–7.8)
ERYTHROCYTE [DISTWIDTH] IN BLOOD BY AUTOMATED COUNT: 15.2 % (ref 11.9–14.6)
ERYTHROCYTE [SEDIMENTATION RATE] IN BLOOD: 5 MM/HR (ref 0–20)
ETHANOL SERPL-MCNC: <3 MG/DL (ref 0–0.08)
GLOBULIN SER CALC-MCNC: 3.1 G/DL (ref 2.8–4.5)
GLUCOSE SERPL-MCNC: 101 MG/DL (ref 65–100)
GLUCOSE UR STRIP.AUTO-MCNC: NEGATIVE MG/DL
HCT VFR BLD AUTO: 31.5 % (ref 35.8–46.3)
HGB BLD-MCNC: 9.6 G/DL (ref 11.7–15.4)
HGB UR QL STRIP: NEGATIVE
IMM GRANULOCYTES # BLD AUTO: 0 K/UL (ref 0–0.5)
IMM GRANULOCYTES NFR BLD AUTO: 0 % (ref 0–5)
KETONES UR QL STRIP.AUTO: NEGATIVE MG/DL
LEUKOCYTE ESTERASE UR QL STRIP.AUTO: NEGATIVE
LIPASE SERPL-CCNC: 71 U/L (ref 73–393)
LYMPHOCYTES # BLD: 1.7 K/UL (ref 0.5–4.6)
LYMPHOCYTES NFR BLD: 26 % (ref 13–44)
MAGNESIUM SERPL-MCNC: 2.2 MG/DL (ref 1.8–2.4)
MCH RBC QN AUTO: 22.9 PG (ref 26.1–32.9)
MCHC RBC AUTO-ENTMCNC: 30.5 G/DL (ref 31.4–35)
MCV RBC AUTO: 75 FL (ref 82–102)
MONOCYTES # BLD: 0.6 K/UL (ref 0.1–1.3)
MONOCYTES NFR BLD: 8 % (ref 4–12)
NEUTS SEG # BLD: 4.3 K/UL (ref 1.7–8.2)
NEUTS SEG NFR BLD: 65 % (ref 43–78)
NITRITE UR QL STRIP.AUTO: NEGATIVE
NRBC # BLD: 0 K/UL (ref 0–0.2)
PH UR STRIP: 7 (ref 5–9)
PLATELET # BLD AUTO: 275 K/UL (ref 150–450)
PMV BLD AUTO: 10 FL (ref 9.4–12.3)
POTASSIUM SERPL-SCNC: 3.5 MMOL/L (ref 3.5–5.1)
PROT SERPL-MCNC: 6.9 G/DL (ref 6.3–8.2)
PROT UR STRIP-MCNC: NEGATIVE MG/DL
RBC # BLD AUTO: 4.2 M/UL (ref 4.05–5.2)
SODIUM SERPL-SCNC: 140 MMOL/L (ref 136–146)
SP GR UR REFRACTOMETRY: 1.01 (ref 1–1.02)
UROBILINOGEN UR QL STRIP.AUTO: 0.2 EU/DL (ref 0.2–1)
WBC # BLD AUTO: 6.7 K/UL (ref 4.3–11.1)

## 2024-03-04 PROCEDURE — 72125 CT NECK SPINE W/O DYE: CPT

## 2024-03-04 PROCEDURE — 81003 URINALYSIS AUTO W/O SCOPE: CPT

## 2024-03-04 PROCEDURE — 96372 THER/PROPH/DIAG INJ SC/IM: CPT

## 2024-03-04 PROCEDURE — 96375 TX/PRO/DX INJ NEW DRUG ADDON: CPT

## 2024-03-04 PROCEDURE — 6370000000 HC RX 637 (ALT 250 FOR IP): Performed by: PHYSICIAN ASSISTANT

## 2024-03-04 PROCEDURE — 83690 ASSAY OF LIPASE: CPT

## 2024-03-04 PROCEDURE — 82077 ASSAY SPEC XCP UR&BREATH IA: CPT

## 2024-03-04 PROCEDURE — 85025 COMPLETE CBC W/AUTO DIFF WBC: CPT

## 2024-03-04 PROCEDURE — 96374 THER/PROPH/DIAG INJ IV PUSH: CPT

## 2024-03-04 PROCEDURE — 99284 EMERGENCY DEPT VISIT MOD MDM: CPT

## 2024-03-04 PROCEDURE — 85652 RBC SED RATE AUTOMATED: CPT

## 2024-03-04 PROCEDURE — 86140 C-REACTIVE PROTEIN: CPT

## 2024-03-04 PROCEDURE — 83735 ASSAY OF MAGNESIUM: CPT

## 2024-03-04 PROCEDURE — 6360000002 HC RX W HCPCS: Performed by: PHYSICIAN ASSISTANT

## 2024-03-04 PROCEDURE — 2580000003 HC RX 258: Performed by: PHYSICIAN ASSISTANT

## 2024-03-04 PROCEDURE — 80053 COMPREHEN METABOLIC PANEL: CPT

## 2024-03-04 PROCEDURE — 70450 CT HEAD/BRAIN W/O DYE: CPT

## 2024-03-04 PROCEDURE — 93005 ELECTROCARDIOGRAM TRACING: CPT | Performed by: PHYSICIAN ASSISTANT

## 2024-03-04 RX ORDER — METHOCARBAMOL 500 MG/1
500 TABLET, FILM COATED ORAL 3 TIMES DAILY PRN
Qty: 30 TABLET | Refills: 0 | Status: SHIPPED | OUTPATIENT
Start: 2024-03-04 | End: 2024-03-04

## 2024-03-04 RX ORDER — METOCLOPRAMIDE HYDROCHLORIDE 5 MG/ML
10 INJECTION INTRAMUSCULAR; INTRAVENOUS
Status: COMPLETED | OUTPATIENT
Start: 2024-03-04 | End: 2024-03-04

## 2024-03-04 RX ORDER — SUMATRIPTAN 6 MG/.5ML
6 INJECTION, SOLUTION SUBCUTANEOUS
Status: COMPLETED | OUTPATIENT
Start: 2024-03-04 | End: 2024-03-04

## 2024-03-04 RX ORDER — LIDOCAINE 50 MG/G
1 PATCH TOPICAL DAILY
Qty: 30 PATCH | Refills: 0 | Status: SHIPPED | OUTPATIENT
Start: 2024-03-04 | End: 2024-04-03

## 2024-03-04 RX ORDER — LIDOCAINE 50 MG/G
1 PATCH TOPICAL DAILY
Qty: 30 PATCH | Refills: 0 | Status: SHIPPED | OUTPATIENT
Start: 2024-03-04 | End: 2024-03-04

## 2024-03-04 RX ORDER — 0.9 % SODIUM CHLORIDE 0.9 %
1000 INTRAVENOUS SOLUTION INTRAVENOUS
Status: COMPLETED | OUTPATIENT
Start: 2024-03-04 | End: 2024-03-04

## 2024-03-04 RX ORDER — BUTALBITAL, ACETAMINOPHEN AND CAFFEINE 50; 325; 40 MG/1; MG/1; MG/1
2 TABLET ORAL
Status: COMPLETED | OUTPATIENT
Start: 2024-03-04 | End: 2024-03-04

## 2024-03-04 RX ORDER — METHOCARBAMOL 500 MG/1
500 TABLET, FILM COATED ORAL 3 TIMES DAILY PRN
Qty: 30 TABLET | Refills: 0 | Status: SHIPPED | OUTPATIENT
Start: 2024-03-04 | End: 2024-03-14

## 2024-03-04 RX ORDER — ONDANSETRON 2 MG/ML
4 INJECTION INTRAMUSCULAR; INTRAVENOUS
Status: COMPLETED | OUTPATIENT
Start: 2024-03-04 | End: 2024-03-04

## 2024-03-04 RX ORDER — KETOROLAC TROMETHAMINE 30 MG/ML
30 INJECTION, SOLUTION INTRAMUSCULAR; INTRAVENOUS ONCE
Status: COMPLETED | OUTPATIENT
Start: 2024-03-04 | End: 2024-03-04

## 2024-03-04 RX ORDER — BUTALBITAL, ACETAMINOPHEN AND CAFFEINE 50; 325; 40 MG/1; MG/1; MG/1
1 TABLET ORAL EVERY 4 HOURS PRN
Qty: 40 TABLET | Refills: 0 | Status: SHIPPED | OUTPATIENT
Start: 2024-03-04 | End: 2024-03-04

## 2024-03-04 RX ORDER — BUTALBITAL, ACETAMINOPHEN AND CAFFEINE 50; 325; 40 MG/1; MG/1; MG/1
1 TABLET ORAL EVERY 4 HOURS PRN
Qty: 40 TABLET | Refills: 0 | Status: SHIPPED | OUTPATIENT
Start: 2024-03-04

## 2024-03-04 RX ADMIN — SUMATRIPTAN 6 MG: 6 INJECTION SUBCUTANEOUS at 20:29

## 2024-03-04 RX ADMIN — BUTALBITAL, ACETAMINOPHEN, AND CAFFEINE 2 TABLET: 50; 325; 40 TABLET ORAL at 19:30

## 2024-03-04 RX ADMIN — HYDROMORPHONE HYDROCHLORIDE 0.5 MG: 1 INJECTION, SOLUTION INTRAMUSCULAR; INTRAVENOUS; SUBCUTANEOUS at 20:53

## 2024-03-04 RX ADMIN — ONDANSETRON 4 MG: 2 INJECTION INTRAMUSCULAR; INTRAVENOUS at 19:30

## 2024-03-04 RX ADMIN — KETOROLAC TROMETHAMINE 30 MG: 30 INJECTION, SOLUTION INTRAMUSCULAR at 19:31

## 2024-03-04 RX ADMIN — METOCLOPRAMIDE 10 MG: 5 INJECTION, SOLUTION INTRAMUSCULAR; INTRAVENOUS at 21:04

## 2024-03-04 RX ADMIN — SODIUM CHLORIDE 1000 ML: 9 INJECTION, SOLUTION INTRAVENOUS at 19:30

## 2024-03-04 ASSESSMENT — PAIN SCALES - GENERAL
PAINLEVEL_OUTOF10: 8
PAINLEVEL_OUTOF10: 5
PAINLEVEL_OUTOF10: 4
PAINLEVEL_OUTOF10: 8

## 2024-03-04 ASSESSMENT — PAIN DESCRIPTION - LOCATION
LOCATION: HEAD
LOCATION: HEAD

## 2024-03-04 ASSESSMENT — PAIN - FUNCTIONAL ASSESSMENT: PAIN_FUNCTIONAL_ASSESSMENT: 0-10

## 2024-03-04 NOTE — ED TRIAGE NOTES
Pt ambulatory with c/o fall in which she hit her head X2. Pt states she doesn't remember the first time and family reports LOC. Pt denies taking blood thinners. Family reports the first fall was caused by syncope and the second fall was mechanical.

## 2024-03-04 NOTE — ED PROVIDER NOTES
Musculoskeletal:         General: Normal range of motion.      Cervical back: Normal range of motion.   Skin:     General: Skin is warm.      Capillary Refill: Capillary refill takes less than 2 seconds.   Neurological:      General: No focal deficit present.      Mental Status: She is alert and oriented to person, place, and time. Mental status is at baseline.      GCS: GCS eye subscore is 4. GCS verbal subscore is 5. GCS motor subscore is 6.      Cranial Nerves: Cranial nerves 2-12 are intact.      Sensory: Sensation is intact.      Motor: Motor function is intact.      Coordination: Coordination is intact.      Gait: Gait is intact.   Psychiatric:         Mood and Affect: Mood normal.         Behavior: Behavior normal.         Thought Content: Thought content normal.         Judgment: Judgment normal.        Procedures     Procedures    Orders Placed This Encounter   Procedures    CT HEAD WO CONTRAST    CT CERVICAL SPINE WO CONTRAST    CBC with Auto Differential    Comprehensive Metabolic Panel    Magnesium    Lipase    Urinalysis w rflx microscopic    Ethanol    C-Reactive Protein    Sedimentation Rate    Cardiac Monitor - ED Only    Continuous Pulse Oximetry    Orthostatic blood pressure and pulse    EKG 12 Lead    Saline lock IV        Medications given during this emergency department visit:  Medications   butalbital-acetaminophen-caffeine (FIORICET, ESGIC) per tablet 2 tablet (2 tablets Oral Given 3/4/24 1930)   ketorolac (TORADOL) injection 30 mg (30 mg IntraVENous Given 3/4/24 1931)   ondansetron (ZOFRAN) injection 4 mg (4 mg IntraVENous Given 3/4/24 1930)   sodium chloride 0.9 % bolus 1,000 mL (1,000 mLs IntraVENous New Bag 3/4/24 1930)   SUMAtriptan (IMITREX) injection 6 mg (6 mg SubCUTAneous Given 3/4/24 2029)   HYDROmorphone (DILAUDID) injection 0.5 mg (0.5 mg IntraVENous Given 3/4/24 2053)   metoclopramide (REGLAN) injection 10 mg (10 mg IntraVENous Given 3/4/24 2104)       New Prescriptions

## 2024-03-05 LAB
EKG ATRIAL RATE: 79 BPM
EKG DIAGNOSIS: NORMAL
EKG P AXIS: 72 DEGREES
EKG P-R INTERVAL: 142 MS
EKG Q-T INTERVAL: 408 MS
EKG QRS DURATION: 80 MS
EKG QTC CALCULATION (BAZETT): 467 MS
EKG R AXIS: 78 DEGREES
EKG T AXIS: 61 DEGREES
EKG VENTRICULAR RATE: 79 BPM

## 2024-03-05 PROCEDURE — 93010 ELECTROCARDIOGRAM REPORT: CPT | Performed by: INTERNAL MEDICINE

## 2024-03-05 NOTE — ED NOTES
I have reviewed discharge instructions with the patient.  The patient verbalized understanding.    Patient left ED via Discharge Method: wheelchair to Home with family.    Opportunity for questions and clarification provided.       Patient given 2 scripts.         To continue your aftercare when you leave the hospital, you may receive an automated call from our care team to check in on how you are doing.  This is a free service and part of our promise to provide the best care and service to meet your aftercare needs.” If you have questions, or wish to unsubscribe from this service please call [phone].  Thank you for Choosing our [facility] Emergency Department.

## 2024-03-05 NOTE — DISCHARGE INSTRUCTIONS
Please contact your primary care physician for follow-up.  You are anemic again.  The hemoglobin was 9.6 and hematocrit was 31.5.  Make sure you are taking your iron daily.  Rest, drink plenty of fluids, return to the ED if you are worsening in any way.  Try using heat to your neck, massage and stretching multiple times a day.

## 2024-05-27 ENCOUNTER — APPOINTMENT (OUTPATIENT)
Dept: CT IMAGING | Age: 44
End: 2024-05-27
Payer: COMMERCIAL

## 2024-05-27 LAB
BASOPHILS # BLD: 0 K/UL (ref 0–0.2)
BASOPHILS NFR BLD: 1 % (ref 0–2)
DIFFERENTIAL METHOD BLD: ABNORMAL
EOSINOPHIL # BLD: 0.1 K/UL (ref 0–0.8)
EOSINOPHIL NFR BLD: 2 % (ref 0.5–7.8)
ERYTHROCYTE [DISTWIDTH] IN BLOOD BY AUTOMATED COUNT: 20.9 % (ref 11.9–14.6)
HCT VFR BLD AUTO: 32.5 % (ref 35.8–46.3)
HGB BLD-MCNC: 9.7 G/DL (ref 11.7–15.4)
IMM GRANULOCYTES # BLD AUTO: 0 K/UL (ref 0–0.5)
IMM GRANULOCYTES NFR BLD AUTO: 1 % (ref 0–5)
LYMPHOCYTES # BLD: 1.5 K/UL (ref 0.5–4.6)
LYMPHOCYTES NFR BLD: 26 % (ref 13–44)
MCH RBC QN AUTO: 22.9 PG (ref 26.1–32.9)
MCHC RBC AUTO-ENTMCNC: 29.8 G/DL (ref 31.4–35)
MCV RBC AUTO: 76.8 FL (ref 82–102)
MONOCYTES # BLD: 0.5 K/UL (ref 0.1–1.3)
MONOCYTES NFR BLD: 8 % (ref 4–12)
NEUTS SEG # BLD: 3.7 K/UL (ref 1.7–8.2)
NEUTS SEG NFR BLD: 64 % (ref 43–78)
NRBC # BLD: 0 K/UL (ref 0–0.2)
PLATELET # BLD AUTO: 228 K/UL (ref 150–450)
PMV BLD AUTO: 9.8 FL (ref 9.4–12.3)
RBC # BLD AUTO: 4.23 M/UL (ref 4.05–5.2)
WBC # BLD AUTO: 5.8 K/UL (ref 4.3–11.1)

## 2024-05-27 PROCEDURE — 96375 TX/PRO/DX INJ NEW DRUG ADDON: CPT

## 2024-05-27 PROCEDURE — 80053 COMPREHEN METABOLIC PANEL: CPT

## 2024-05-27 PROCEDURE — 99285 EMERGENCY DEPT VISIT HI MDM: CPT

## 2024-05-27 PROCEDURE — 96374 THER/PROPH/DIAG INJ IV PUSH: CPT

## 2024-05-27 PROCEDURE — 85025 COMPLETE CBC W/AUTO DIFF WBC: CPT

## 2024-05-27 RX ORDER — KETOROLAC TROMETHAMINE 15 MG/ML
15 INJECTION, SOLUTION INTRAMUSCULAR; INTRAVENOUS ONCE
Status: COMPLETED | OUTPATIENT
Start: 2024-05-27 | End: 2024-05-28

## 2024-05-27 RX ORDER — DEXAMETHASONE SODIUM PHOSPHATE 10 MG/ML
10 INJECTION INTRAMUSCULAR; INTRAVENOUS ONCE
Status: COMPLETED | OUTPATIENT
Start: 2024-05-27 | End: 2024-05-28

## 2024-05-27 ASSESSMENT — LIFESTYLE VARIABLES
HOW MANY STANDARD DRINKS CONTAINING ALCOHOL DO YOU HAVE ON A TYPICAL DAY: 1 OR 2
HOW OFTEN DO YOU HAVE A DRINK CONTAINING ALCOHOL: MONTHLY OR LESS

## 2024-05-27 ASSESSMENT — PAIN - FUNCTIONAL ASSESSMENT: PAIN_FUNCTIONAL_ASSESSMENT: 0-10

## 2024-05-27 ASSESSMENT — PAIN SCALES - GENERAL: PAINLEVEL_OUTOF10: 5

## 2024-05-28 ENCOUNTER — HOSPITAL ENCOUNTER (EMERGENCY)
Age: 44
Discharge: HOME | End: 2024-05-28
Attending: STUDENT IN AN ORGANIZED HEALTH CARE EDUCATION/TRAINING PROGRAM
Payer: COMMERCIAL

## 2024-05-28 ENCOUNTER — APPOINTMENT (OUTPATIENT)
Dept: CT IMAGING | Age: 44
End: 2024-05-28
Payer: COMMERCIAL

## 2024-05-28 VITALS
BODY MASS INDEX: 26.51 KG/M2 | DIASTOLIC BLOOD PRESSURE: 68 MMHG | RESPIRATION RATE: 14 BRPM | WEIGHT: 179 LBS | HEART RATE: 78 BPM | TEMPERATURE: 98.4 F | HEIGHT: 69 IN | OXYGEN SATURATION: 99 % | SYSTOLIC BLOOD PRESSURE: 109 MMHG

## 2024-05-28 DIAGNOSIS — R51.9 NONINTRACTABLE HEADACHE, UNSPECIFIED CHRONICITY PATTERN, UNSPECIFIED HEADACHE TYPE: Primary | ICD-10-CM

## 2024-05-28 LAB
ALBUMIN SERPL-MCNC: 3.1 G/DL (ref 3.5–5)
ALBUMIN/GLOB SERPL: 1.2 (ref 1–1.9)
ALP SERPL-CCNC: 74 U/L (ref 35–104)
ALT SERPL-CCNC: 29 U/L (ref 12–65)
ANION GAP SERPL CALC-SCNC: 10 MMOL/L (ref 9–18)
AST SERPL-CCNC: 26 U/L (ref 15–37)
BILIRUB SERPL-MCNC: <0.2 MG/DL (ref 0–1.2)
BILIRUB UR QL: NEGATIVE
BUN SERPL-MCNC: 9 MG/DL (ref 6–23)
CALCIUM SERPL-MCNC: 8.4 MG/DL (ref 8.8–10.2)
CHLORIDE SERPL-SCNC: 105 MMOL/L (ref 98–107)
CO2 SERPL-SCNC: 26 MMOL/L (ref 20–28)
CREAT SERPL-MCNC: 0.53 MG/DL (ref 0.6–1.1)
GLOBULIN SER CALC-MCNC: 2.6 G/DL (ref 2.3–3.5)
GLUCOSE SERPL-MCNC: 162 MG/DL (ref 70–99)
GLUCOSE UR QL STRIP.AUTO: NEGATIVE MG/DL
KETONES UR-MCNC: NEGATIVE MG/DL
LEUKOCYTE ESTERASE UR QL STRIP: NEGATIVE
NITRITE UR QL: NEGATIVE
PH UR: 5.5 (ref 5–9)
POTASSIUM SERPL-SCNC: 3.9 MMOL/L (ref 3.5–5.1)
PROT SERPL-MCNC: 5.7 G/DL (ref 6.3–8.2)
PROT UR QL: NEGATIVE MG/DL
RBC # UR STRIP: NEGATIVE
SERVICE CMNT-IMP: ABNORMAL
SODIUM SERPL-SCNC: 141 MMOL/L (ref 136–145)
SP GR UR: >1.03 (ref 1–1.02)
UROBILINOGEN UR QL: 0.2 EU/DL (ref 0.2–1)

## 2024-05-28 PROCEDURE — 70450 CT HEAD/BRAIN W/O DYE: CPT

## 2024-05-28 PROCEDURE — 6360000002 HC RX W HCPCS: Performed by: STUDENT IN AN ORGANIZED HEALTH CARE EDUCATION/TRAINING PROGRAM

## 2024-05-28 PROCEDURE — 81003 URINALYSIS AUTO W/O SCOPE: CPT

## 2024-05-28 PROCEDURE — 6360000004 HC RX CONTRAST MEDICATION: Performed by: STUDENT IN AN ORGANIZED HEALTH CARE EDUCATION/TRAINING PROGRAM

## 2024-05-28 PROCEDURE — 70498 CT ANGIOGRAPHY NECK: CPT

## 2024-05-28 RX ORDER — PROCHLORPERAZINE EDISYLATE 5 MG/ML
10 INJECTION INTRAMUSCULAR; INTRAVENOUS
Status: COMPLETED | OUTPATIENT
Start: 2024-05-28 | End: 2024-05-28

## 2024-05-28 RX ORDER — DIPHENHYDRAMINE HYDROCHLORIDE 50 MG/ML
25 INJECTION INTRAMUSCULAR; INTRAVENOUS
Status: COMPLETED | OUTPATIENT
Start: 2024-05-28 | End: 2024-05-28

## 2024-05-28 RX ORDER — IBUPROFEN 800 MG/1
800 TABLET ORAL EVERY 6 HOURS PRN
Qty: 20 TABLET | Refills: 0 | Status: SHIPPED | OUTPATIENT
Start: 2024-05-28

## 2024-05-28 RX ADMIN — IOPAMIDOL 100 ML: 755 INJECTION, SOLUTION INTRAVENOUS at 00:46

## 2024-05-28 RX ADMIN — DEXAMETHASONE SODIUM PHOSPHATE 10 MG: 10 INJECTION INTRAMUSCULAR; INTRAVENOUS at 00:40

## 2024-05-28 RX ADMIN — PROCHLORPERAZINE EDISYLATE 10 MG: 5 INJECTION INTRAMUSCULAR; INTRAVENOUS at 01:34

## 2024-05-28 RX ADMIN — KETOROLAC TROMETHAMINE 15 MG: 15 INJECTION, SOLUTION INTRAMUSCULAR; INTRAVENOUS at 00:40

## 2024-05-28 RX ADMIN — DIPHENHYDRAMINE HYDROCHLORIDE 25 MG: 50 INJECTION INTRAMUSCULAR; INTRAVENOUS at 01:33

## 2024-05-28 ASSESSMENT — PAIN SCALES - GENERAL: PAINLEVEL_OUTOF10: 7

## 2024-05-28 NOTE — ED PROVIDER NOTES
Automatic exposure control was used as a dose lowering technique.      Radiation Dose: CTDI is 61.27 mGy. DLP is 729.37 mGy-cm.      Contrast Type: iopamidol (ISOVUE-370) 76 . Contrast Volume: 100 mL      Report signed on 05/28/2024 (01:29 Eastern Time)   Signed by: Ion Rhodes M.D.   Reading Location: 10         NIH Stroke Scale  Interval: Baseline  Level of Consciousness (1a): Alert  LOC Questions (1b): Answers both correctly  LOC Commands (1c): Performs both tasks correctly  Best Gaze (2): Normal  Visual (3): No visual loss  Facial Palsy (4): Normal symmetrical movement  Motor Arm, Left (5a): No drift  Motor Arm, Right (5b): No drift  Motor Leg, Left (6a): No drift  Motor Leg, Right (6b): No drift  Limb Ataxia (7): Absent  Sensory (8): Normal  Best Language (9): No aphasia  Dysarthria (10): Normal  Extinction and Inattention (11): No abnormality  Total: 0         No results for input(s): \"COVID19\" in the last 72 hours.    Voice dictation software was used during the making of this note.  This software is not perfect and grammatical and other typographical errors may be present.  This note has not been completely proofread for errors.      Bernard Bowser, DO  05/28/24 0438

## 2024-05-28 NOTE — ED NOTES
Patient mobility status  with no difficulty. Provider aware     I have reviewed discharge instructions with the patient.  The patient verbalized understanding.    Patient left ED via Discharge Method: ambulatory to Home with Friend.    Opportunity for questions and clarification provided.     Patient given 1 e-scripts.

## 2024-05-28 NOTE — ED TRIAGE NOTES
Pt ambulatory with c/o sharp head pain and right side numbness she states happened this morning at ross 0600. Pt reports continued headache and intermittent numbness to her right hand and right leg since. Pt reports also feeling \"out of sorts.\"  Pt denies history of stroke/migraines.     Provider made aware of pt condition.

## 2024-05-28 NOTE — DISCHARGE INSTRUCTIONS
Your CT, CTA and lab work all appear stable.  You have been prescribed medication to treat headache as well as provided with a referral to a local neurologist for follow-up.  Return to this department for worsening symptoms, concerns or questions.

## 2025-05-06 ENCOUNTER — HOSPITAL ENCOUNTER (EMERGENCY)
Age: 45
Discharge: HOME | End: 2025-05-07
Attending: EMERGENCY MEDICINE
Payer: MEDICARE

## 2025-05-06 DIAGNOSIS — B37.49 YEAST UTI: ICD-10-CM

## 2025-05-06 DIAGNOSIS — M54.32 SCIATICA OF LEFT SIDE: ICD-10-CM

## 2025-05-06 DIAGNOSIS — N39.0 URINARY TRACT INFECTION WITHOUT HEMATURIA, SITE UNSPECIFIED: Primary | ICD-10-CM

## 2025-05-06 PROCEDURE — 87086 URINE CULTURE/COLONY COUNT: CPT

## 2025-05-06 PROCEDURE — 85025 COMPLETE CBC W/AUTO DIFF WBC: CPT

## 2025-05-06 PROCEDURE — 96374 THER/PROPH/DIAG INJ IV PUSH: CPT

## 2025-05-06 PROCEDURE — 83690 ASSAY OF LIPASE: CPT

## 2025-05-06 PROCEDURE — 81001 URINALYSIS AUTO W/SCOPE: CPT

## 2025-05-06 PROCEDURE — 87186 SC STD MICRODIL/AGAR DIL: CPT

## 2025-05-06 PROCEDURE — 81003 URINALYSIS AUTO W/O SCOPE: CPT

## 2025-05-06 PROCEDURE — 87088 URINE BACTERIA CULTURE: CPT

## 2025-05-06 PROCEDURE — 6360000002 HC RX W HCPCS: Performed by: EMERGENCY MEDICINE

## 2025-05-06 PROCEDURE — 81025 URINE PREGNANCY TEST: CPT

## 2025-05-06 PROCEDURE — 80053 COMPREHEN METABOLIC PANEL: CPT

## 2025-05-06 PROCEDURE — 99284 EMERGENCY DEPT VISIT MOD MDM: CPT

## 2025-05-06 RX ORDER — KETOROLAC TROMETHAMINE 15 MG/ML
15 INJECTION, SOLUTION INTRAMUSCULAR; INTRAVENOUS ONCE
Status: COMPLETED | OUTPATIENT
Start: 2025-05-06 | End: 2025-05-06

## 2025-05-06 RX ADMIN — KETOROLAC TROMETHAMINE 15 MG: 15 INJECTION, SOLUTION INTRAMUSCULAR; INTRAVENOUS at 23:47

## 2025-05-06 ASSESSMENT — PAIN SCALES - GENERAL
PAINLEVEL_OUTOF10: 9
PAINLEVEL_OUTOF10: 5

## 2025-05-06 ASSESSMENT — PAIN DESCRIPTION - ORIENTATION: ORIENTATION: LOWER

## 2025-05-06 ASSESSMENT — PAIN DESCRIPTION - LOCATION
LOCATION: ABDOMEN;BACK
LOCATION: ABDOMEN

## 2025-05-06 ASSESSMENT — PAIN - FUNCTIONAL ASSESSMENT: PAIN_FUNCTIONAL_ASSESSMENT: 0-10

## 2025-05-07 VITALS
WEIGHT: 179 LBS | HEIGHT: 69 IN | HEART RATE: 81 BPM | SYSTOLIC BLOOD PRESSURE: 102 MMHG | TEMPERATURE: 97.7 F | RESPIRATION RATE: 16 BRPM | BODY MASS INDEX: 26.51 KG/M2 | DIASTOLIC BLOOD PRESSURE: 59 MMHG | OXYGEN SATURATION: 98 %

## 2025-05-07 LAB
ALBUMIN SERPL-MCNC: 3 G/DL (ref 3.5–5)
ALBUMIN/GLOB SERPL: 1.2 (ref 1–1.9)
ALP SERPL-CCNC: 67 U/L (ref 35–104)
ALT SERPL-CCNC: 30 U/L (ref 8–45)
ANION GAP SERPL CALC-SCNC: 7 MMOL/L (ref 7–16)
AST SERPL-CCNC: 29 U/L (ref 15–37)
BACTERIA URNS QL MICRO: ABNORMAL /HPF
BASOPHILS # BLD: 0.02 K/UL (ref 0–0.2)
BASOPHILS NFR BLD: 0.3 % (ref 0–2)
BILIRUB SERPL-MCNC: <0.2 MG/DL (ref 0–1.2)
BILIRUB UR QL: NEGATIVE
BILIRUB UR QL: NEGATIVE
BUN SERPL-MCNC: 7 MG/DL (ref 6–23)
CALCIUM SERPL-MCNC: 7.9 MG/DL (ref 8.8–10.2)
CASTS URNS QL MICRO: ABNORMAL /LPF
CHLORIDE SERPL-SCNC: 108 MMOL/L (ref 98–107)
CO2 SERPL-SCNC: 26 MMOL/L (ref 20–29)
CREAT SERPL-MCNC: 0.53 MG/DL (ref 0.6–1.1)
CRYSTALS URNS QL MICRO: 0 /LPF
DIFFERENTIAL METHOD BLD: ABNORMAL
EOSINOPHIL # BLD: 0.1 K/UL (ref 0–0.8)
EOSINOPHIL NFR BLD: 1.7 % (ref 0.5–7.8)
EPI CELLS #/AREA URNS HPF: ABNORMAL /HPF
ERYTHROCYTE [DISTWIDTH] IN BLOOD BY AUTOMATED COUNT: 14.2 % (ref 11.9–14.6)
GLOBULIN SER CALC-MCNC: 2.5 G/DL (ref 2.3–3.5)
GLUCOSE SERPL-MCNC: 102 MG/DL (ref 70–99)
GLUCOSE UR QL STRIP.AUTO: NEGATIVE MG/DL
GLUCOSE UR QL STRIP.AUTO: NEGATIVE MG/DL
HCG UR QL: NEGATIVE
HCT VFR BLD AUTO: 31.2 % (ref 35.8–46.3)
HGB BLD-MCNC: 10.3 G/DL (ref 11.7–15.4)
IMM GRANULOCYTES # BLD AUTO: 0.06 K/UL (ref 0–0.5)
IMM GRANULOCYTES NFR BLD AUTO: 1 % (ref 0–5)
KETONES UR-MCNC: NEGATIVE MG/DL
KETONES UR-MCNC: NEGATIVE MG/DL
LEUKOCYTE ESTERASE UR QL STRIP: ABNORMAL
LEUKOCYTE ESTERASE UR QL STRIP: ABNORMAL
LIPASE SERPL-CCNC: 85 U/L (ref 13–60)
LYMPHOCYTES # BLD: 1.08 K/UL (ref 0.5–4.6)
LYMPHOCYTES NFR BLD: 18.6 % (ref 13–44)
MCH RBC QN AUTO: 29.1 PG (ref 26.1–32.9)
MCHC RBC AUTO-ENTMCNC: 33 G/DL (ref 31.4–35)
MCV RBC AUTO: 88.1 FL (ref 82–102)
MONOCYTES # BLD: 0.6 K/UL (ref 0.1–1.3)
MONOCYTES NFR BLD: 10.3 % (ref 4–12)
MUCOUS THREADS URNS QL MICRO: 0 /LPF
NEUTS SEG # BLD: 3.96 K/UL (ref 1.7–8.2)
NEUTS SEG NFR BLD: 68.1 % (ref 43–78)
NITRITE UR QL: POSITIVE
NITRITE UR QL: POSITIVE
NRBC # BLD: 0 K/UL (ref 0–0.2)
PH UR: 6 (ref 5–9)
PH UR: 6 (ref 5–9)
PLATELET # BLD AUTO: 146 K/UL (ref 150–450)
PMV BLD AUTO: 11.5 FL (ref 9.4–12.3)
POTASSIUM SERPL-SCNC: 3.4 MMOL/L (ref 3.5–5.1)
PROT SERPL-MCNC: 5.5 G/DL (ref 6.3–8.2)
PROT UR QL: NEGATIVE MG/DL
PROT UR QL: NEGATIVE MG/DL
RBC # BLD AUTO: 3.54 M/UL (ref 4.05–5.2)
RBC # UR STRIP: ABNORMAL
RBC # UR STRIP: ABNORMAL
RBC #/AREA URNS HPF: ABNORMAL /HPF
SERVICE CMNT-IMP: ABNORMAL
SERVICE CMNT-IMP: ABNORMAL
SODIUM SERPL-SCNC: 141 MMOL/L (ref 136–145)
SP GR UR: 1.01 (ref 1–1.02)
SP GR UR: 1.01 (ref 1–1.02)
UROBILINOGEN UR QL: 0.2 EU/DL (ref 0.2–1)
UROBILINOGEN UR QL: 0.2 EU/DL (ref 0.2–1)
WBC # BLD AUTO: 5.8 K/UL (ref 4.3–11.1)
WBC URNS QL MICRO: ABNORMAL /HPF
YEAST URNS QL MICRO: ABNORMAL

## 2025-05-07 PROCEDURE — 6370000000 HC RX 637 (ALT 250 FOR IP): Performed by: EMERGENCY MEDICINE

## 2025-05-07 PROCEDURE — 6360000002 HC RX W HCPCS: Performed by: EMERGENCY MEDICINE

## 2025-05-07 PROCEDURE — 96375 TX/PRO/DX INJ NEW DRUG ADDON: CPT

## 2025-05-07 PROCEDURE — 96376 TX/PRO/DX INJ SAME DRUG ADON: CPT

## 2025-05-07 PROCEDURE — 2500000003 HC RX 250 WO HCPCS: Performed by: EMERGENCY MEDICINE

## 2025-05-07 RX ORDER — TIZANIDINE 2 MG/1
2 TABLET ORAL 3 TIMES DAILY PRN
Qty: 20 TABLET | Refills: 0 | Status: SHIPPED | OUTPATIENT
Start: 2025-05-07

## 2025-05-07 RX ORDER — CEPHALEXIN 500 MG/1
500 CAPSULE ORAL 3 TIMES DAILY
Qty: 21 CAPSULE | Refills: 0 | Status: SHIPPED | OUTPATIENT
Start: 2025-05-07 | End: 2025-05-07

## 2025-05-07 RX ORDER — KETOROLAC TROMETHAMINE 15 MG/ML
15 INJECTION, SOLUTION INTRAMUSCULAR; INTRAVENOUS ONCE
Status: COMPLETED | OUTPATIENT
Start: 2025-05-07 | End: 2025-05-07

## 2025-05-07 RX ORDER — TIZANIDINE 2 MG/1
2 TABLET ORAL 3 TIMES DAILY PRN
Qty: 20 TABLET | Refills: 0 | Status: SHIPPED | OUTPATIENT
Start: 2025-05-07 | End: 2025-05-07

## 2025-05-07 RX ORDER — FLUCONAZOLE 100 MG/1
200 TABLET ORAL
Status: COMPLETED | OUTPATIENT
Start: 2025-05-07 | End: 2025-05-07

## 2025-05-07 RX ORDER — CEPHALEXIN 500 MG/1
500 CAPSULE ORAL 3 TIMES DAILY
Qty: 21 CAPSULE | Refills: 0 | Status: SHIPPED | OUTPATIENT
Start: 2025-05-07 | End: 2025-05-14

## 2025-05-07 RX ORDER — TIZANIDINE 2 MG/1
4 TABLET ORAL ONCE
Status: COMPLETED | OUTPATIENT
Start: 2025-05-07 | End: 2025-05-07

## 2025-05-07 RX ADMIN — KETOROLAC TROMETHAMINE 15 MG: 15 INJECTION, SOLUTION INTRAMUSCULAR; INTRAVENOUS at 00:22

## 2025-05-07 RX ADMIN — TIZANIDINE 4 MG: 2 TABLET ORAL at 00:21

## 2025-05-07 RX ADMIN — FLUCONAZOLE 200 MG: 100 TABLET ORAL at 00:36

## 2025-05-07 RX ADMIN — WATER 1000 MG: 1 INJECTION INTRAMUSCULAR; INTRAVENOUS; SUBCUTANEOUS at 00:35

## 2025-05-07 ASSESSMENT — PAIN DESCRIPTION - LOCATION
LOCATION: ABDOMEN
LOCATION: ABDOMEN;BACK

## 2025-05-07 ASSESSMENT — PAIN SCALES - GENERAL: PAINLEVEL_OUTOF10: 9

## 2025-05-07 NOTE — DISCHARGE INSTRUCTIONS
Take all antibiotic.  Take anti-inflammatory such as Motrin or Aleve in addition to muscle relaxant as needed.  Return for worsening or concerning symptoms.

## 2025-05-07 NOTE — ED PROVIDER NOTES
Emergency Department Provider Note       [facility] [place], [place] EMERGENCY DEPT   PCP: Kaylene Hooks DO   Age: 45 y.o.   Sex: female     DISPOSITION Decision To Discharge 05/07/2025 12:43:55 AM    ICD-10-CM    1. Urinary tract infection without hematuria, site unspecified  N39.0       2. Sciatica of left side  M54.32       3. Yeast UTI  B37.49           Medical Decision Making     Pain treated.  Likely sciatica and UTI.  No fever or evidence of pyelonephritis.  Advised primary care follow-up.     1 acute complicated illness or injury.  Prescription drug management performed.  Shared medical decision making was utilized in creating the patients health plan today.  I independently ordered and reviewed each unique test.    I reviewed external records: ED visit note from a different ED.   I reviewed external records: provider visit note from PCP.  I reviewed external records: previous lab results from outside ED.  I reviewed external records: previous imaging study including radiologist interpretation.                     History     45-year-old female presents with 3 days of left-sided lower back pain radiating down her leg, suprapubic pain, foul odor to her urine, and nausea.  She denies any burning with urination or blood in urine.  No documented fevers.  She states symptoms are similar to prior UTIs.  Also similar prior episodes of sciatica prior to her back surgery 15 years ago for herniated disc.  No new numbness or weakness.  No falls or injuries.  Denies vaginal bleeding or discharge.  Has been having normal periods.        Physical Exam     Vitals signs and nursing note reviewed:  Vitals:    05/06/25 2211 05/07/25 0025 05/07/25 0030   BP: (!) 121/92 (!) 108/52 (!) 105/58   Pulse: 81     Resp: 16 18 18   Temp: 97.7 °F (36.5 °C)     TempSrc: Oral     SpO2: 100% 97% 99%   Weight: 81.2 kg (179 lb)     Height: 1.753 m (5' 9\")        Physical Exam  Vitals and nursing note reviewed.   Constitutional:       Appearance: Normal  prescriptions:     New Prescriptions    CEPHALEXIN (KEFLEX) 500 MG CAPSULE    Take 1 capsule by mouth 3 times daily for 7 days    TIZANIDINE (ZANAFLEX) 2 MG TABLET    Take 1 tablet by mouth 3 times daily as needed (back pain)        Past History and Complexity:     Past Medical History:   Diagnosis Date    Arthritis     Chronic pain     back    Depression     Diabetes (HCC)     Type 2, av fastin glucose 214, can tell when glucose lower than50    Gastrointestinal disorder     reflux    GERD (gastroesophageal reflux disease)     Hypertension     Hypothyroidism     Infectious disease     MRSA    PELON (obstructive sleep apnea) 6/18/2018    Ovarian cyst     Thyroid disease     hypothyroidism        Past Surgical History:   Procedure Laterality Date    GYN  2009    ovarian cyst drained    ORTHOPEDIC SURGERY  2007, 2015    back x 2    [place] UNLISTED PROCEDURE ABDOMEN PERITONEUM & OMENTUM      gastric bypass, Dr. Alfaro        Social History     Socioeconomic History    Marital status:      Spouse name: None    Number of children: None    Years of education: None    Highest education level: None   Tobacco Use    Smoking status: Never    Smokeless tobacco: Never   Substance and Sexual Activity    Alcohol use: No    Drug use: No     Social Drivers of Health     Physical Activity: Insufficiently Active (3/19/2021)    [facility]    Physical Activity     Days of Exercise per Week: 3 days     Minutes of Exercise per Session: 10     Total Minutes of Exercise per Week: 30   Social Connections: Unknown (3/19/2021)    Received from [facility]    Social Connections     Frequency of Communication with Friends and Family: Not asked     Frequency of Social Gatherings with Friends and Family: Not asked   Intimate Partner Violence: Not At Risk (3/12/2025)    Received from [facility]    Intimate Partner Violence     Fear of Current or Ex-Partner: No     Emotionally Abused: No     Physically Abused: No

## 2025-05-07 NOTE — ED TRIAGE NOTES
Pt ambulated to triage with c/o lower abdominal and lower back pain. Denies dysuria but does have hx of UTI's.

## 2025-05-08 ENCOUNTER — RESULTS FOLLOW-UP (OUTPATIENT)
Dept: EMERGENCY DEPT | Age: 45
End: 2025-05-08

## 2025-05-09 LAB
BACTERIA SPEC CULT: ABNORMAL
BACTERIA SPEC CULT: ABNORMAL
SERVICE CMNT-IMP: ABNORMAL

## 2025-05-26 ENCOUNTER — APPOINTMENT (OUTPATIENT)
Dept: GENERAL RADIOLOGY | Age: 45
End: 2025-05-26
Payer: MEDICARE

## 2025-05-26 ENCOUNTER — HOSPITAL ENCOUNTER (EMERGENCY)
Age: 45
Discharge: HOME | End: 2025-05-26
Attending: EMERGENCY MEDICINE
Payer: MEDICARE

## 2025-05-26 VITALS
DIASTOLIC BLOOD PRESSURE: 66 MMHG | OXYGEN SATURATION: 100 % | HEIGHT: 69 IN | BODY MASS INDEX: 27.55 KG/M2 | RESPIRATION RATE: 18 BRPM | SYSTOLIC BLOOD PRESSURE: 133 MMHG | HEART RATE: 77 BPM | TEMPERATURE: 98.6 F | WEIGHT: 186 LBS

## 2025-05-26 DIAGNOSIS — M25.541 ARTHRALGIA OF HAND, RIGHT: ICD-10-CM

## 2025-05-26 DIAGNOSIS — M25.542 ARTHRALGIA OF HAND, LEFT: Primary | ICD-10-CM

## 2025-05-26 PROCEDURE — 73130 X-RAY EXAM OF HAND: CPT

## 2025-05-26 PROCEDURE — 99283 EMERGENCY DEPT VISIT LOW MDM: CPT

## 2025-05-26 RX ORDER — PREDNISONE 20 MG/1
40 TABLET ORAL DAILY
Qty: 10 TABLET | Refills: 0 | Status: SHIPPED | OUTPATIENT
Start: 2025-05-26 | End: 2025-05-31

## 2025-05-26 ASSESSMENT — PAIN SCALES - GENERAL: PAINLEVEL_OUTOF10: 8

## 2025-05-26 ASSESSMENT — PAIN - FUNCTIONAL ASSESSMENT: PAIN_FUNCTIONAL_ASSESSMENT: 0-10

## 2025-05-26 ASSESSMENT — PAIN DESCRIPTION - LOCATION: LOCATION: ARM;HAND

## 2025-05-26 NOTE — DISCHARGE INSTRUCTIONS
Your x-rays did not show any fractures but did show some evidence of mild arthritis in the finger joints.  Take prednisone as prescribed once daily for 5 days.  Over-the-counter ibuprofen 400 to 600 mg every 6 hours as needed for pain.  Tylenol 500 to 650 mg every 6 hours as needed for pain.  You may alternate them every 3-4 hours for best results.  Call your primary care doctor tomorrow for follow-up and recheck

## 2025-05-26 NOTE — ED TRIAGE NOTES
Pt reports last night having bilateral arm/hand pain worse in left arm from elbow to thumb. Denies know trauma injury. Pt reports history of nerve Impingement in neck in past. Pt neck and arms feel tight and sore. Pt reports twinges of pain. Taking Voltaren and tylenol without improvement. Pt reports no longer followed by ortho spine. Pt requesting pain management and referral back to ortho.

## 2025-05-26 NOTE — ED PROVIDER NOTES
Emergency Department Provider Note       [facility] [place] EMERGENCY DEPT   PCP: Kaylene Hooks DO   Age: 45 y.o.   Sex: female     DISPOSITION Decision To Discharge 05/26/2025 12:57:26 PM    ICD-10-CM    1. Arthralgia of hand, left  M25.542       2. Arthralgia of hand, right  M25.541           Medical Decision Making     Patient will be provided a note saying that she was here for evaluation today but I will not provide ongoing work excuse.  Patient needs to see her PCP and have an arthritis workup.  She may need to find a new line of work given some of the symptoms seem chronic.  I will place on prednisone to see if we can get some improvement over the next few days.  Will obtain x-ray imaging of both hands today     1 chronic illness with exacerbation.  Over the counter drug management performed.  Prescription drug management performed.  Shared medical decision making was utilized in creating the patients health plan today.  I independently ordered and reviewed each unique test.           I interpreted the X-rays x-ray of the left and right hand reveal no obvious fracture.  There does appear to be some arthritic changes especially at the PIP and DIP joints.  They are not severe.              History     45-year-old female complains of chronic bilateral arm pain and especially new hand pain and stiffness for the last couple of days and worse this morning.  She has had some neck pain in the past but that is resolved.  Patient requesting work note as she feels she left work today and is unable to work with her new problem to triage she requested referral back to Ortho and pain management    The history is provided by the patient.     Physical Exam     Vitals signs and nursing note reviewed:  Vitals:    05/26/25 1040   BP: 133/66   Pulse: 77   Resp: 18   Temp: 98.6 °F (37 °C)   TempSrc: Oral   SpO2: 100%   Weight: 84.4 kg (186 lb)   Height: 1.753 m (5' 9\")      Physical Exam  Vitals and nursing note reviewed.